# Patient Record
Sex: MALE | Race: WHITE | HISPANIC OR LATINO | Employment: OTHER | URBAN - METROPOLITAN AREA
[De-identification: names, ages, dates, MRNs, and addresses within clinical notes are randomized per-mention and may not be internally consistent; named-entity substitution may affect disease eponyms.]

---

## 2019-01-30 ENCOUNTER — APPOINTMENT (OUTPATIENT)
Dept: ADMISSIONS | Facility: MEDICAL CENTER | Age: 59
DRG: 469 | End: 2019-01-30
Attending: ORTHOPAEDIC SURGERY
Payer: COMMERCIAL

## 2019-01-30 DIAGNOSIS — Z01.812 PRE-OPERATIVE LABORATORY EXAMINATION: ICD-10-CM

## 2019-01-30 DIAGNOSIS — Z01.810 PRE-OPERATIVE CARDIOVASCULAR EXAMINATION: ICD-10-CM

## 2019-01-30 NOTE — DISCHARGE PLANNING
DISCHARGE PLANNING NOTE - TOTAL JOINT     Procedure: Procedure(s):  ANKLE TOTAL ARTHROPLASTY - CRISTOBAL DRESSING, POSS GASTROC SOLEUS RECESSION  Procedure Date: 2/14/2019  Insurance:  [facility]: [facility] / Plan: [facility]   Equipment currently available at home? crutches and kneeling scooter  Who will be with you during your recovery? spouse    Plan: Client has had a subtalar fusion in the past and ankle ligament repair and removal of ankle hardware in 2016. He left pre-admissions prior to this CM speaking to him. Anticipate discharge home.

## 2019-02-14 ENCOUNTER — HOSPITAL ENCOUNTER (INPATIENT)
Facility: MEDICAL CENTER | Age: 59
LOS: 1 days | DRG: 469 | End: 2019-02-15
Attending: ORTHOPAEDIC SURGERY | Admitting: ORTHOPAEDIC SURGERY
Payer: COMMERCIAL

## 2019-02-14 ENCOUNTER — APPOINTMENT (OUTPATIENT)
Dept: RADIOLOGY | Facility: MEDICAL CENTER | Age: 59
DRG: 469 | End: 2019-02-14
Attending: ORTHOPAEDIC SURGERY
Payer: COMMERCIAL

## 2019-02-14 VITALS
TEMPERATURE: 98.5 F | HEART RATE: 96 BPM | DIASTOLIC BLOOD PRESSURE: 92 MMHG | OXYGEN SATURATION: 93 % | SYSTOLIC BLOOD PRESSURE: 153 MMHG | RESPIRATION RATE: 17 BRPM | BODY MASS INDEX: 34.72 KG/M2 | HEIGHT: 70 IN | WEIGHT: 242.51 LBS

## 2019-02-14 LAB
ANION GAP SERPL CALC-SCNC: 7 MMOL/L (ref 0–11.9)
BUN SERPL-MCNC: 7 MG/DL (ref 8–22)
CALCIUM SERPL-MCNC: 9.5 MG/DL (ref 8.5–10.5)
CHLORIDE SERPL-SCNC: 107 MMOL/L (ref 96–112)
CO2 SERPL-SCNC: 24 MMOL/L (ref 20–33)
CREAT SERPL-MCNC: 0.92 MG/DL (ref 0.5–1.4)
GLUCOSE SERPL-MCNC: 98 MG/DL (ref 65–99)
POTASSIUM SERPL-SCNC: 3.6 MMOL/L (ref 3.6–5.5)
SODIUM SERPL-SCNC: 138 MMOL/L (ref 135–145)

## 2019-02-14 PROCEDURE — 500423 HCHG DRESSING, ABD COMBINE: Performed by: ORTHOPAEDIC SURGERY

## 2019-02-14 PROCEDURE — A4314 CATH W/DRAINAGE 2-WAY LATEX: HCPCS | Performed by: ORTHOPAEDIC SURGERY

## 2019-02-14 PROCEDURE — 502000 HCHG MISC OR IMPLANTS RC 0278: Performed by: ORTHOPAEDIC SURGERY

## 2019-02-14 PROCEDURE — 160035 HCHG PACU - 1ST 60 MINS PHASE I: Performed by: ORTHOPAEDIC SURGERY

## 2019-02-14 PROCEDURE — 0L8N3ZZ DIVISION OF RIGHT LOWER LEG TENDON, PERCUTANEOUS APPROACH: ICD-10-PCS | Performed by: ORTHOPAEDIC SURGERY

## 2019-02-14 PROCEDURE — 160041 HCHG SURGERY MINUTES - EA ADDL 1 MIN LEVEL 4: Performed by: ORTHOPAEDIC SURGERY

## 2019-02-14 PROCEDURE — A9270 NON-COVERED ITEM OR SERVICE: HCPCS | Performed by: ANESTHESIOLOGY

## 2019-02-14 PROCEDURE — 502240 HCHG MISC OR SUPPLY RC 0272: Performed by: ORTHOPAEDIC SURGERY

## 2019-02-14 PROCEDURE — 3E0T3BZ INTRODUCTION OF ANESTHETIC AGENT INTO PERIPHERAL NERVES AND PLEXI, PERCUTANEOUS APPROACH: ICD-10-PCS | Performed by: ANESTHESIOLOGY

## 2019-02-14 PROCEDURE — 160002 HCHG RECOVERY MINUTES (STAT): Performed by: ORTHOPAEDIC SURGERY

## 2019-02-14 PROCEDURE — 160036 HCHG PACU - EA ADDL 30 MINS PHASE I: Performed by: ORTHOPAEDIC SURGERY

## 2019-02-14 PROCEDURE — 73610 X-RAY EXAM OF ANKLE: CPT | Mod: RT

## 2019-02-14 PROCEDURE — 700102 HCHG RX REV CODE 250 W/ 637 OVERRIDE(OP)

## 2019-02-14 PROCEDURE — 700111 HCHG RX REV CODE 636 W/ 250 OVERRIDE (IP)

## 2019-02-14 PROCEDURE — A9270 NON-COVERED ITEM OR SERVICE: HCPCS | Performed by: ORTHOPAEDIC SURGERY

## 2019-02-14 PROCEDURE — 700101 HCHG RX REV CODE 250

## 2019-02-14 PROCEDURE — 700102 HCHG RX REV CODE 250 W/ 637 OVERRIDE(OP): Performed by: ORTHOPAEDIC SURGERY

## 2019-02-14 PROCEDURE — 503339 HCHG DRESSSING PICO: Performed by: ORTHOPAEDIC SURGERY

## 2019-02-14 PROCEDURE — A4306 DRUG DELIVERY SYSTEM <=50 ML: HCPCS | Performed by: ANESTHESIOLOGY

## 2019-02-14 PROCEDURE — A6223 GAUZE >16<=48 NO W/SAL W/O B: HCPCS | Performed by: ORTHOPAEDIC SURGERY

## 2019-02-14 PROCEDURE — 501838 HCHG SUTURE GENERAL: Performed by: ORTHOPAEDIC SURGERY

## 2019-02-14 PROCEDURE — 160029 HCHG SURGERY MINUTES - 1ST 30 MINS LEVEL 4: Performed by: ORTHOPAEDIC SURGERY

## 2019-02-14 PROCEDURE — 80048 BASIC METABOLIC PNL TOTAL CA: CPT

## 2019-02-14 PROCEDURE — 0SRF0JZ REPLACEMENT OF RIGHT ANKLE JOINT WITH SYNTHETIC SUBSTITUTE, OPEN APPROACH: ICD-10-PCS | Performed by: ORTHOPAEDIC SURGERY

## 2019-02-14 PROCEDURE — 500881 HCHG PACK, EXTREMITY: Performed by: ORTHOPAEDIC SURGERY

## 2019-02-14 PROCEDURE — 160048 HCHG OR STATISTICAL LEVEL 1-5: Performed by: ORTHOPAEDIC SURGERY

## 2019-02-14 PROCEDURE — 500367 HCHG DRAIN KIT, HEMOVAC: Performed by: ORTHOPAEDIC SURGERY

## 2019-02-14 PROCEDURE — 700102 HCHG RX REV CODE 250 W/ 637 OVERRIDE(OP): Performed by: ANESTHESIOLOGY

## 2019-02-14 PROCEDURE — 700111 HCHG RX REV CODE 636 W/ 250 OVERRIDE (IP): Performed by: PHYSICIAN ASSISTANT

## 2019-02-14 PROCEDURE — A6454 SELF-ADHER BAND W>=3" <5"/YD: HCPCS | Performed by: ORTHOPAEDIC SURGERY

## 2019-02-14 PROCEDURE — 700111 HCHG RX REV CODE 636 W/ 250 OVERRIDE (IP): Performed by: ANESTHESIOLOGY

## 2019-02-14 PROCEDURE — 160009 HCHG ANES TIME/MIN: Performed by: ORTHOPAEDIC SURGERY

## 2019-02-14 PROCEDURE — 770006 HCHG ROOM/CARE - MED/SURG/GYN SEMI*

## 2019-02-14 PROCEDURE — A9270 NON-COVERED ITEM OR SERVICE: HCPCS

## 2019-02-14 DEVICE — IMPLANTABLE DEVICE: Type: IMPLANTABLE DEVICE | Site: ANKLE | Status: FUNCTIONAL

## 2019-02-14 RX ORDER — POTASSIUM CHLORIDE 20 MEQ/1
40 TABLET, EXTENDED RELEASE ORAL DAILY
Status: DISCONTINUED | OUTPATIENT
Start: 2019-02-14 | End: 2019-02-15 | Stop reason: HOSPADM

## 2019-02-14 RX ORDER — SENNOSIDES A AND B 8.6 MG/1
8.6 TABLET, FILM COATED ORAL
Status: DISCONTINUED | OUTPATIENT
Start: 2019-02-14 | End: 2019-02-15 | Stop reason: HOSPADM

## 2019-02-14 RX ORDER — HALOPERIDOL 5 MG/ML
1 INJECTION INTRAMUSCULAR
Status: DISCONTINUED | OUTPATIENT
Start: 2019-02-14 | End: 2019-02-14 | Stop reason: HOSPADM

## 2019-02-14 RX ORDER — OXYCODONE HYDROCHLORIDE 5 MG/1
10 TABLET ORAL
Status: DISCONTINUED | OUTPATIENT
Start: 2019-02-14 | End: 2019-02-15 | Stop reason: HOSPADM

## 2019-02-14 RX ORDER — MAGNESIUM HYDROXIDE 1200 MG/15ML
LIQUID ORAL
Status: COMPLETED | OUTPATIENT
Start: 2019-02-14 | End: 2019-02-14

## 2019-02-14 RX ORDER — CYCLOBENZAPRINE HCL 10 MG
10 TABLET ORAL 2 TIMES DAILY PRN
Status: DISCONTINUED | OUTPATIENT
Start: 2019-02-14 | End: 2019-02-15 | Stop reason: HOSPADM

## 2019-02-14 RX ORDER — ONDANSETRON 2 MG/ML
4 INJECTION INTRAMUSCULAR; INTRAVENOUS
Status: DISCONTINUED | OUTPATIENT
Start: 2019-02-14 | End: 2019-02-14 | Stop reason: HOSPADM

## 2019-02-14 RX ORDER — ACETAMINOPHEN 500 MG
TABLET ORAL
Status: DISPENSED
Start: 2019-02-14 | End: 2019-02-14

## 2019-02-14 RX ORDER — ACETAMINOPHEN 500 MG
1000 TABLET ORAL EVERY 6 HOURS
Status: DISCONTINUED | OUTPATIENT
Start: 2019-02-14 | End: 2019-02-15 | Stop reason: HOSPADM

## 2019-02-14 RX ORDER — ZOLPIDEM TARTRATE 5 MG/1
10 TABLET ORAL NIGHTLY PRN
Status: DISCONTINUED | OUTPATIENT
Start: 2019-02-14 | End: 2019-02-15 | Stop reason: HOSPADM

## 2019-02-14 RX ORDER — MIDAZOLAM HYDROCHLORIDE 1 MG/ML
1 INJECTION INTRAMUSCULAR; INTRAVENOUS
Status: DISCONTINUED | OUTPATIENT
Start: 2019-02-14 | End: 2019-02-14 | Stop reason: HOSPADM

## 2019-02-14 RX ORDER — ANASTROZOLE 1 MG/1
1 TABLET ORAL
Status: DISCONTINUED | OUTPATIENT
Start: 2019-02-15 | End: 2019-02-15 | Stop reason: HOSPADM

## 2019-02-14 RX ORDER — DIPHENHYDRAMINE HYDROCHLORIDE 50 MG/ML
12.5 INJECTION INTRAMUSCULAR; INTRAVENOUS
Status: DISCONTINUED | OUTPATIENT
Start: 2019-02-14 | End: 2019-02-14 | Stop reason: HOSPADM

## 2019-02-14 RX ORDER — MUSCLE RUB CREAM 100; 150 MG/G; MG/G
1 CREAM TOPICAL 2 TIMES DAILY PRN
COMMUNITY
End: 2024-01-30

## 2019-02-14 RX ORDER — TRAZODONE HYDROCHLORIDE 50 MG/1
50 TABLET ORAL NIGHTLY
COMMUNITY

## 2019-02-14 RX ORDER — MORPHINE SULFATE 4 MG/ML
4 INJECTION, SOLUTION INTRAMUSCULAR; INTRAVENOUS
Status: DISCONTINUED | OUTPATIENT
Start: 2019-02-14 | End: 2019-02-15 | Stop reason: HOSPADM

## 2019-02-14 RX ORDER — TESTOSTERONE CYPIONATE 200 MG/ML
200 INJECTION, SOLUTION INTRAMUSCULAR SEE ADMIN INSTRUCTIONS
COMMUNITY

## 2019-02-14 RX ORDER — TADALAFIL 5 MG/1
20 TABLET ORAL PRN
COMMUNITY

## 2019-02-14 RX ORDER — POTASSIUM CHLORIDE 20 MEQ/1
40 TABLET, EXTENDED RELEASE ORAL DAILY
COMMUNITY

## 2019-02-14 RX ORDER — FUROSEMIDE 40 MG/1
60 TABLET ORAL DAILY
COMMUNITY

## 2019-02-14 RX ORDER — PREGABALIN 100 MG/1
100 CAPSULE ORAL 3 TIMES DAILY
Status: DISCONTINUED | OUTPATIENT
Start: 2019-02-14 | End: 2019-02-15 | Stop reason: HOSPADM

## 2019-02-14 RX ORDER — HYDROMORPHONE HYDROCHLORIDE 1 MG/ML
0.4 INJECTION, SOLUTION INTRAMUSCULAR; INTRAVENOUS; SUBCUTANEOUS
Status: DISCONTINUED | OUTPATIENT
Start: 2019-02-14 | End: 2019-02-14 | Stop reason: HOSPADM

## 2019-02-14 RX ORDER — OMEPRAZOLE 20 MG/1
20 CAPSULE, DELAYED RELEASE ORAL DAILY
Status: ON HOLD | COMMUNITY
End: 2024-02-06

## 2019-02-14 RX ORDER — ONDANSETRON 2 MG/ML
4 INJECTION INTRAMUSCULAR; INTRAVENOUS EVERY 4 HOURS PRN
Status: DISCONTINUED | OUTPATIENT
Start: 2019-02-14 | End: 2019-02-15 | Stop reason: HOSPADM

## 2019-02-14 RX ORDER — ANASTROZOLE 1 MG/1
1 TABLET ORAL
COMMUNITY

## 2019-02-14 RX ORDER — TRAZODONE HYDROCHLORIDE 50 MG/1
50 TABLET ORAL NIGHTLY
Status: DISCONTINUED | OUTPATIENT
Start: 2019-02-14 | End: 2019-02-15 | Stop reason: HOSPADM

## 2019-02-14 RX ORDER — ALBUTEROL SULFATE 90 UG/1
2 AEROSOL, METERED RESPIRATORY (INHALATION) EVERY 6 HOURS PRN
Status: DISCONTINUED | OUTPATIENT
Start: 2019-02-14 | End: 2019-02-15 | Stop reason: HOSPADM

## 2019-02-14 RX ORDER — SODIUM CHLORIDE, SODIUM LACTATE, POTASSIUM CHLORIDE, CALCIUM CHLORIDE 600; 310; 30; 20 MG/100ML; MG/100ML; MG/100ML; MG/100ML
INJECTION, SOLUTION INTRAVENOUS CONTINUOUS
Status: DISCONTINUED | OUTPATIENT
Start: 2019-02-14 | End: 2019-02-14 | Stop reason: HOSPADM

## 2019-02-14 RX ORDER — HYDROMORPHONE HYDROCHLORIDE 1 MG/ML
0.1 INJECTION, SOLUTION INTRAMUSCULAR; INTRAVENOUS; SUBCUTANEOUS
Status: DISCONTINUED | OUTPATIENT
Start: 2019-02-14 | End: 2019-02-14 | Stop reason: HOSPADM

## 2019-02-14 RX ORDER — OXYCODONE HYDROCHLORIDE AND ACETAMINOPHEN 5; 325 MG/1; MG/1
2 TABLET ORAL
Status: COMPLETED | OUTPATIENT
Start: 2019-02-14 | End: 2019-02-14

## 2019-02-14 RX ORDER — CELECOXIB 200 MG/1
CAPSULE ORAL
Status: DISPENSED
Start: 2019-02-14 | End: 2019-02-14

## 2019-02-14 RX ORDER — OXYCODONE HYDROCHLORIDE 5 MG/1
5 TABLET ORAL
Status: DISCONTINUED | OUTPATIENT
Start: 2019-02-14 | End: 2019-02-15 | Stop reason: HOSPADM

## 2019-02-14 RX ORDER — GUAIFENESIN 200 MG/1
200 TABLET ORAL EVERY 8 HOURS PRN
Status: ON HOLD | COMMUNITY
End: 2024-02-06

## 2019-02-14 RX ORDER — OXYCODONE HYDROCHLORIDE AND ACETAMINOPHEN 5; 325 MG/1; MG/1
1 TABLET ORAL
Status: COMPLETED | OUTPATIENT
Start: 2019-02-14 | End: 2019-02-14

## 2019-02-14 RX ORDER — OMEPRAZOLE 20 MG/1
20 CAPSULE, DELAYED RELEASE ORAL DAILY
Status: DISCONTINUED | OUTPATIENT
Start: 2019-02-14 | End: 2019-02-15 | Stop reason: HOSPADM

## 2019-02-14 RX ORDER — ALBUTEROL SULFATE 90 UG/1
2 AEROSOL, METERED RESPIRATORY (INHALATION) EVERY 6 HOURS PRN
COMMUNITY

## 2019-02-14 RX ORDER — DIPHENHYDRAMINE HYDROCHLORIDE 50 MG/ML
50 INJECTION INTRAMUSCULAR; INTRAVENOUS ONCE
Status: COMPLETED | OUTPATIENT
Start: 2019-02-14 | End: 2019-02-14

## 2019-02-14 RX ORDER — HYDROMORPHONE HYDROCHLORIDE 1 MG/ML
0.2 INJECTION, SOLUTION INTRAMUSCULAR; INTRAVENOUS; SUBCUTANEOUS
Status: DISCONTINUED | OUTPATIENT
Start: 2019-02-14 | End: 2019-02-14 | Stop reason: HOSPADM

## 2019-02-14 RX ORDER — CLOTRIMAZOLE 1 %
1 CREAM (GRAM) TOPICAL 2 TIMES DAILY
Status: DISCONTINUED | OUTPATIENT
Start: 2019-02-14 | End: 2019-02-15 | Stop reason: HOSPADM

## 2019-02-14 RX ORDER — SODIUM CHLORIDE, SODIUM LACTATE, POTASSIUM CHLORIDE, CALCIUM CHLORIDE 600; 310; 30; 20 MG/100ML; MG/100ML; MG/100ML; MG/100ML
INJECTION, SOLUTION INTRAVENOUS ONCE
Status: COMPLETED | OUTPATIENT
Start: 2019-02-14 | End: 2019-02-14

## 2019-02-14 RX ORDER — ERGOCALCIFEROL 1.25 MG/1
50000 CAPSULE ORAL
COMMUNITY
End: 2024-01-30

## 2019-02-14 RX ORDER — MEPERIDINE HYDROCHLORIDE 25 MG/ML
12.5 INJECTION INTRAMUSCULAR; INTRAVENOUS; SUBCUTANEOUS
Status: DISCONTINUED | OUTPATIENT
Start: 2019-02-14 | End: 2019-02-14 | Stop reason: HOSPADM

## 2019-02-14 RX ORDER — PRAZOSIN HYDROCHLORIDE 1 MG/1
1-2 CAPSULE ORAL
Status: ON HOLD | COMMUNITY
End: 2024-02-06

## 2019-02-14 RX ORDER — METOPROLOL TARTRATE 1 MG/ML
1 INJECTION, SOLUTION INTRAVENOUS
Status: DISCONTINUED | OUTPATIENT
Start: 2019-02-14 | End: 2019-02-14 | Stop reason: HOSPADM

## 2019-02-14 RX ORDER — LANOLIN ALCOHOL/MO/W.PET/CERES
1 CREAM (GRAM) TOPICAL
Status: ON HOLD | COMMUNITY
End: 2024-02-06

## 2019-02-14 RX ORDER — PREGABALIN 100 MG/1
100 CAPSULE ORAL 3 TIMES DAILY
COMMUNITY

## 2019-02-14 RX ADMIN — CLOTRIMAZOLE 1 APPLICATION: 10 CREAM TOPICAL at 17:06

## 2019-02-14 RX ADMIN — HYDROMORPHONE HYDROCHLORIDE 0.2 MG: 1 INJECTION, SOLUTION INTRAMUSCULAR; INTRAVENOUS; SUBCUTANEOUS at 13:50

## 2019-02-14 RX ADMIN — FENTANYL CITRATE 50 MCG: 50 INJECTION, SOLUTION INTRAMUSCULAR; INTRAVENOUS at 11:54

## 2019-02-14 RX ADMIN — SODIUM CHLORIDE, SODIUM LACTATE, POTASSIUM CHLORIDE, CALCIUM CHLORIDE 1000 ML: 600; 310; 30; 20 INJECTION, SOLUTION INTRAVENOUS at 10:15

## 2019-02-14 RX ADMIN — CYCLOBENZAPRINE 10 MG: 10 TABLET, FILM COATED ORAL at 17:10

## 2019-02-14 RX ADMIN — OXYCODONE AND ACETAMINOPHEN 2 TABLET: 5; 325 TABLET ORAL at 10:10

## 2019-02-14 RX ADMIN — OXYCODONE HYDROCHLORIDE 10 MG: 5 TABLET ORAL at 21:49

## 2019-02-14 RX ADMIN — FENTANYL CITRATE 50 MCG: 50 INJECTION, SOLUTION INTRAMUSCULAR; INTRAVENOUS at 10:19

## 2019-02-14 RX ADMIN — FENTANYL CITRATE 50 MCG: 50 INJECTION, SOLUTION INTRAMUSCULAR; INTRAVENOUS at 09:56

## 2019-02-14 RX ADMIN — PREGABALIN 100 MG: 100 CAPSULE ORAL at 17:05

## 2019-02-14 RX ADMIN — OMEPRAZOLE 20 MG: 20 CAPSULE, DELAYED RELEASE ORAL at 17:05

## 2019-02-14 RX ADMIN — HYDROMORPHONE HYDROCHLORIDE 0.4 MG: 1 INJECTION, SOLUTION INTRAMUSCULAR; INTRAVENOUS; SUBCUTANEOUS at 13:45

## 2019-02-14 RX ADMIN — FENTANYL CITRATE 50 MCG: 50 INJECTION, SOLUTION INTRAMUSCULAR; INTRAVENOUS at 09:54

## 2019-02-14 RX ADMIN — DIPHENHYDRAMINE HYDROCHLORIDE 50 MG: 50 INJECTION INTRAMUSCULAR; INTRAVENOUS at 21:50

## 2019-02-14 RX ADMIN — OXYCODONE HYDROCHLORIDE 10 MG: 5 TABLET ORAL at 17:09

## 2019-02-14 RX ADMIN — HYDROMORPHONE HYDROCHLORIDE 0.4 MG: 1 INJECTION, SOLUTION INTRAMUSCULAR; INTRAVENOUS; SUBCUTANEOUS at 13:40

## 2019-02-14 RX ADMIN — DIPHENHYDRAMINE HYDROCHLORIDE 12.5 MG: 50 INJECTION INTRAMUSCULAR; INTRAVENOUS at 10:31

## 2019-02-14 RX ADMIN — METOPROLOL TARTRATE 25 MG: 25 TABLET, FILM COATED ORAL at 17:10

## 2019-02-14 RX ADMIN — FENTANYL CITRATE 50 MCG: 50 INJECTION, SOLUTION INTRAMUSCULAR; INTRAVENOUS at 11:51

## 2019-02-14 RX ADMIN — FENTANYL CITRATE 50 MCG: 50 INJECTION, SOLUTION INTRAMUSCULAR; INTRAVENOUS at 10:01

## 2019-02-14 RX ADMIN — SODIUM CHLORIDE, SODIUM LACTATE, POTASSIUM CHLORIDE, CALCIUM CHLORIDE: 600; 310; 30; 20 INJECTION, SOLUTION INTRAVENOUS at 06:16

## 2019-02-14 RX ADMIN — POTASSIUM CHLORIDE 40 MEQ: 1500 TABLET, EXTENDED RELEASE ORAL at 17:05

## 2019-02-14 RX ADMIN — SENNOSIDES 8.6 MG: 8.6 TABLET, FILM COATED ORAL at 17:06

## 2019-02-14 RX ADMIN — ACETAMINOPHEN 1000 MG: 500 TABLET ORAL at 17:11

## 2019-02-14 ASSESSMENT — PATIENT HEALTH QUESTIONNAIRE - PHQ9
4. FEELING TIRED OR HAVING LITTLE ENERGY: NOT AT ALL
SUM OF ALL RESPONSES TO PHQ QUESTIONS 1-9: 3
6. FEELING BAD ABOUT YOURSELF - OR THAT YOU ARE A FAILURE OR HAVE LET YOURSELF OR YOUR FAMILY DOWN: NOT AL ALL
1. LITTLE INTEREST OR PLEASURE IN DOING THINGS: NOT AT ALL
3. TROUBLE FALLING OR STAYING ASLEEP OR SLEEPING TOO MUCH: NOT AT ALL
8. MOVING OR SPEAKING SO SLOWLY THAT OTHER PEOPLE COULD HAVE NOTICED. OR THE OPPOSITE, BEING SO FIGETY OR RESTLESS THAT YOU HAVE BEEN MOVING AROUND A LOT MORE THAN USUAL: NOT AT ALL
9. THOUGHTS THAT YOU WOULD BE BETTER OFF DEAD, OR OF HURTING YOURSELF: NOT AT ALL
SUM OF ALL RESPONSES TO PHQ9 QUESTIONS 1 AND 2: 1
5. POOR APPETITE OR OVEREATING: NOT AT ALL
7. TROUBLE CONCENTRATING ON THINGS, SUCH AS READING THE NEWSPAPER OR WATCHING TELEVISION: MORE THAN HALF THE DAYS
2. FEELING DOWN, DEPRESSED, IRRITABLE, OR HOPELESS: SEVERAL DAYS

## 2019-02-14 ASSESSMENT — LIFESTYLE VARIABLES: EVER_SMOKED: NEVER

## 2019-02-14 NOTE — OR NURSING
Pre-op complete. Patient and family updated on plan of care. All questions answered at this time.  Call light within reach, advised to call for any questions or concerns. Hourly rounding in place.

## 2019-02-14 NOTE — OR SURGEON
Immediate Post OP Note    PreOp Diagnosis: Right ankle OA    PostOp Diagnosis: Same    Procedure(s):  ANKLE TOTAL ARTHROPLASTY ; POSS GASTROC SOLEUS RECESSION - Wound Class: Clean    Surgeon(s):  ALEJA Chandler M.D. Patrick S Barousse, M.D.    Anesthesiologist/Type of Anesthesia:  Anesthesiologist: Sangita French M.D./General    Surgical Staff:  Circulator: Zoë Mcdonald R.N.; Stephanie Honeycutt R.N.  Scrub Person: Deidre Kaufman  Radiology Technologist: Brandon HOFFMAN Gross    Specimens removed if any:  * No specimens in log *    Estimated Blood Loss: 10cc    TT: 118 min @ 250mmHg    Findings: Degen change, varus tilt    Complications: None        2/14/2019 9:31 AM Sonido Lujan M.D.

## 2019-02-14 NOTE — OR NURSING
PT c/o severe bladder pain, bladder scanner brought to bedside, scanned for 871 ml urine, 18F ospina placed, clear yellow urine drainage, approx 1500 ml clear yellow urine.  PT ex wife Cate at bedside, updated on status.

## 2019-02-14 NOTE — OR NURSING
Called into OR to speak w/ ANES/surgeon, advised RN of situation, PT wants to leave [facility], advised Dr. Lujan will stop by after this case, PT and ex wife updated on status. Belongings obtained and at PT bedside.

## 2019-02-14 NOTE — PROGRESS NOTES
"Ortho Prog Note    POD #0 s/p Right total ankle arthroplasty, GSR    Unhappy about placement of Ospina in PACU, will stay this evening and overnight for proper recovery    /88   Pulse 94   Temp 37.4 °C (99.3 °F) (Temporal)   Resp 18   Ht 1.778 m (5' 10\")   Wt 110 kg (242 lb 8.1 oz)   SpO2 97%   BMI 34.80 kg/m²     RLE: Block in place. SLS clean and dry      POD #0 Ospina in place    Admit ortho overnight  NWB RLE  PT eval and treat  May DC ospina this evening or tomorrow morning per patient preference  DC home tomorrow am    Sonido Lujan MD    "

## 2019-02-14 NOTE — PROGRESS NOTES
Post op evaluation of Sciatic catheter/Saphenous block placed preoperatively.    Pt seen and examined in PACU.  Pt complains of throbbing, aching pain in back of leg from his ankle jail up his calf.  Bottom of foot and lateral foot is numb.      Examination with ultrasound revealed catheter in good position.  10ml bolus of bupivicaine 0.5% through catheter.  In addition, reblock higher in leg for sciatic block with additional 15mL bupivicaine under ultrasound guidance.     As previously discussed with patient, he has had issues with ONQ catheters in the past not working when he had surgeries down in at [place] in 2011, 2012 for similar procedures. He believed they were issues with the devices, but he did not have luck with pain relief with them in the past.     Discussed with pt in detail, will increase rate of ONQ to 10mL/hr to potentially get more spread up his leg for better coverage. If it still provides no relief after reblock wears off, will plan to pull catheter tomorrow.      Pt may need placement of catheter higher in his leg in the future (he will be scheduled for another followup leg procedure in the future).    Sangita French MD  Anesthesiology

## 2019-02-14 NOTE — OR NURSING
Rec'd PT from OR w/ Anes/RN.  PT agitated, restless and anxious upon admit to PACU, not knew behavior per hx.  PT medicated for severe pain, Fentanyl 50 mcg x 4 , Oxycodone 10 mg PO and Benadryl 12.5mg for c/o itching.  OnQ pump intact, 6 ml to start, hemovac clamped w/ serosanguinous drainage noted, CRISTOBAL in place, buzzing noted, paged Dr. Lujan regarding same, Dr. Lujan to bedside, CRISTOBAL in place, reinforced w/ tape.  Anesthesia to bedside to check placement of On Q due to PT pain, placement checked, bolus given.  PT attempting to urinate , has hx of post op retention.  RX on chart, d/c order in computer, report to be called to d/c area.

## 2019-02-15 NOTE — PROGRESS NOTES
Charge RN Notes:    Met with patient for service recovery. Offered to examine the groin area but the patient strongly refused to let this RN do a full assessment on it. Patient is adamant in leaving AMA. Explained that MD is aware of the situation and he has added measures to relieve the  Irritation. Pt has been prescribed with Clotrimazole and Benadryl.     Called Dalila Song, updated her about patient's condition. She said she will consult with Dr. Boston.    Dr. Boston called back and said that we have properly  addressed the patient's concerns.    @0010 Spoke with the patient again and he is already getting dressed and decided to leave AMA. Patient educated about the risks and consequences of leaving AMA.

## 2019-02-15 NOTE — PROGRESS NOTES
1900 Received report from Day RN, patient has redness and irritation on his groin and scrotum due to CHG wipes prior to surgery this morning. Patient has been putting cream to area and has been using ice pack.   2100 Patient c/o of increased pain on scrotum and that ice pack and cream is not helping at all. He requests to see an MD at bedside to evaluate.  2115 Paged VICTORIA Song from (Dr Michel office), notified of patient's request. Per PA, will see pt kumar., continue with cream and ice pack. Wash sulema-area with soap and water. Also received order for Benadryl 50mg IV one time.   2130 Updated pt of POC, pt verbalized wanting to leave AMA. [facility]. Notified Charge RN.

## 2019-02-15 NOTE — PROGRESS NOTES
Patient decided to leave [facility] despite education.  Patient has ospina cath, hemovac, ONQ pump, and CRISTOBAL in place. Patients PIV d/c'd. Escorted by 2 staff RN via w/c to his car, patient left with his ex-wife Cate.

## 2019-02-15 NOTE — PROGRESS NOTES
· 2 RN skin check complete with VILMA Arguello.  · Devices in place on Q pump, prevena, splint, hemovac, SCDs.  · Skin assessed under devices unable to assess with splint.  · Redness to scrotum from CHG wipes, cream given.

## 2019-02-15 NOTE — PROGRESS NOTES
Anesthesia Progress Note    - Postop followup of Sciatic catheter placed 02/14/2019 for total ankle arthroplasty and gastroc recession with Dr. Lujan.       Attempted to contact pt on cell phone on file, as pt left hospital AMA prior to being examined.  Left voicemail and will attempt to reach later.  Pt should have been sent home with informational brochure regarding the catheter with contact information on how to reach an MD on call if there are any issues.  Pt has had OnQ pump in the past... Will attempt to call pt again later this afternoon and tomorrow to followup.    Sangita French MD    02/15/19 [id number]

## 2019-02-20 NOTE — OP REPORT
DATE OF SERVICE:  02/14/2019    PREOPERATIVE DIAGNOSES:  Right ankle posttraumatic arthritis.    POSTOPERATIVE DIAGNOSES:  Right ankle posttraumatic arthritis.    PROCEDURE:  1.  Right STAR total ankle arthroplasty.  2.  Right gastrocsoleus recession.    SURGEON:  Sonido Lujan MD    ASSISTANT:  Isaias Trotter MD; Nasim Duggan MD and Carola Good,   certified first assist.    ANESTHESIA:  General with peripheral nerve catheter requested by me for   postoperative pain control.    ESTIMATED BLOOD LOSS:  20 mL.    TOURNIQUET TIME:  118 minutes at 250 mmHg.    FINDINGS:  Degenerative change within the joint, mild underlying hindfoot   valgus after triple fusion at an outside institution.    IMPLANTS:  [place] STAR large tibial component, small talar component, 11 mm   poly.    COMPLICATIONS:  None.    OUTCOME:  PACU in stable.    HISTORY OF PRESENT ILLNESS:  This gentleman is well known to me.  He has had   multiple procedures at outside institutions including a triple fusion.  I have   also performed hardware removal and ankle arthroscopy years ago.  He has had   numerous injections that have relieved nearly all of his discomfort.  He is   now indicated for a total ankle arthroplasty.  He was greeted in the   preoperative holding area and identified by name and medical record number.    The right lower extremity was marked.  Risks of the procedure including   bleeding, infection, pain, continuation of symptoms, neurovascular damage, and   need for more surgery were discussed and he provided written consent.    DESCRIPTION OF PROCEDURE:  He was taken to the operating room, placed on the   table in supine position.  Preoperative antibiotics were administered and   general anesthesia was induced.  A nonsterile tourniquet was placed on the   right thigh.  Right lower extremity prepped and draped in the usual sterile   fashion.  An operative pause was undertaken, where all present were in   agreement with  patient identification, laterality, and procedure to be   performed.  Esmarch bandage was used to exsanguinate the limb and the   tourniquet was raised to 250 mmHg.  A 12 cm longitudinal incision was made for   direct anterior approach.  This did incorporate a small anterior incision he   had had previously at the level of the joint.  Care was taken to avoid damage   to the neurovascular bundle as well as to avoid violating the tibialis   anterior tendon sheath.  I performed an arthrotomy and using combination of   Bovie electrocautery and Delacruz elevator, performed a subperiosteal dissection   of the anterior distal tibia and the dorsal talar neck to the level of the   fibula and the level of the medial malleolus.  We used an osteotome and   rongeur to remove small osteophytes.  We then drilled a 3.2 mm self-drilling   pin into the tibial tubercle at approximately 10 degrees of plantarflexion.    The tibial alignment guide was then placed just greater than 2 fingerbreadths   above the level of the tibia and alignment with the tibial crest.  I placed an   osteotome and the medial gutter to align the cut guide.  We then evaluated   along the course of the tibia with x-ray to ensure proper varus/valgus.  I   then pinned the tibial alignment guide into place with a single unicortical   pin.  We then checked lateral with Jerson Wing to adjust cut height and   plantarflexion, dorsiflexion, small adjustments were made.  We then pinned the   block into place with bicortical pins.  We then used an oscillating saw to   perform the tibial cut followed by a reciprocating saw after placement of the   pins to protect the medial malleolus and the fibula.  We then piecemeal the   cut segments and removed all bone.  We ensured smooth cuts as well as the   safety of the medial malleolus.  We then used a 9 mm joint space evaluator and   determined that cut was adequate.  We then used a curette to remove any   remaining cartilage on the  talus.  We placed a talar cut guide, placed the   foot in approximately 90 degrees and pin the cut guide into place.  We ensured   that the talus was touching the paddle both medially and laterally.  We   inserted medial and lateral protected pins and using an oscillating saw to   perform the cut.  We then used a 12 mm joint space evaluator and determined   there was adequate space.  The posterior cut sizing template was then inserted   and we elected for a size small component.  We then attached the drill guide   and drilled the central pin after confirmation of proper alignment on lateral   fluoroscopy.  We then inserted small-sized datum with drill pins.  We did the   posterior cut with an oscillating saw, then reamed the anterior aspect of the   talus.  The shavings were saved for bone graft.  We then removed the AP   cutting guide.  We irrigated the joint.  We placed the medial and lateral cut   guide and used a reciprocating saw to make these cuts.  Skin small curved   osteotome was used to remove the medial and lateral aspects of cut talar bone.    We then adjusted this to ensure proper fit of the talar window trial.  We   reamed the keel with the keel mill this was then punched with the keel broach.    We ensured proper talar cut.  We then measured the tibial component to a   size large.  We tested radiographically both large and extra-large and decided   on large.  We then irrigated the joint and inserted a small size talar   component.  We then placed the blue wedge between the talar component and the   barrel PEP guide as we inserted barrel PEP guide on direct lateral radiograph.    The first barrel hole drill was used to drill 1 hole followed by the keel   punch, then plugged.  We then drilled and punched the second.  We irrigated   these tracts.  We then implanted the large tibial component under direct   radiographic guidance.  We then tested the range of motion of the ankle, a 9   mm poly was felt to  be a bit loose, we upsized to an 11.  We felt at this   point that the Silfverskiold test was positive and that the patient would   benefit from a gastrocsoleus recession.    Gastrocsoleus recession:  A 3 cm longitudinal incision was made along the   medial border of the myotendinous junction of the gastrocsoleus complex.    Blunt dissection was carried down to the crural fascia, which was then incised   in line with the incision.  The gastroc fascia was visualized in its entirety   and transected with a 15 blade.  This did give us 20 degrees of additional   dorsiflexion with the knee in extension.  This incision was copiously   irrigated.  Subcutaneous layer was closed with 3-0 Monocryl in buried   interrupted fashion and skin was closed with 3-0 nylon in horizontal mattress   fashion.  I then noted improved range of motion at the ankle with both the   knee in extension and flexion, symmetric liftoff medially and laterally that   was quite acceptable, good range of motion was stable.  We then again   irrigated the joint.  We packed bone graft into the remaining barrel into the   anterior aspect of the barrel hole and implanted an 11 mm spacer.  AP, mortise   and lateral fluoroscopy revealed excellent alignment of the component without   evidence of interval complication.  We then closed the capsular layer with 0   Vicryl in figure-of-eight fashion.  The inferior extensor retinaculum with 0   Vicryl in figure-of-eight fashion, the subcutaneous layer with 3-0 Vicryl in   buried interrupted fashion and skin closed with 3-0 nylon in horizontal   mattress fashion.  We did close this over a Hemovac drain.  We then placed a   [employer] CRISTOBAL disposable incisional wound VAC.  The tourniquet was let   down.  A well-padded posterior splint with stirrup was placed.  Patient was   awakened and extubated in stable condition.    POSTOPERATIVE COURSE:  He will be admitted overnight for pain control and   mobilization.  We  will see him in 10-14 days for suture removal and wound   check, aspirin 81 mg twice daily for DVT prophylaxis.       ____________________________________     MD BRANDO SAHNI / ARLYN    DD:  02/20/2019 07:18:58  DT:  02/20/2019 09:26:16    D#:  [id number]  Job#:  [id number]

## 2022-11-06 ASSESSMENT — ENCOUNTER SYMPTOMS
SLEEP DISTURBANCE: 1
TIME GOING TO BED: VARIES

## 2022-11-07 ENCOUNTER — OFFICE VISIT (OUTPATIENT)
Dept: SLEEP MEDICINE | Facility: MEDICAL CENTER | Age: 62
End: 2022-11-07
Payer: COMMERCIAL

## 2022-11-07 VITALS
HEART RATE: 99 BPM | HEIGHT: 69 IN | SYSTOLIC BLOOD PRESSURE: 112 MMHG | RESPIRATION RATE: 16 BRPM | BODY MASS INDEX: 36.58 KG/M2 | DIASTOLIC BLOOD PRESSURE: 70 MMHG | OXYGEN SATURATION: 93 % | WEIGHT: 247 LBS

## 2022-11-07 DIAGNOSIS — G47.00 FREQUENT NOCTURNAL AWAKENING: ICD-10-CM

## 2022-11-07 DIAGNOSIS — F51.04 CHRONIC INSOMNIA: ICD-10-CM

## 2022-11-07 DIAGNOSIS — G47.33 OSA (OBSTRUCTIVE SLEEP APNEA): Primary | ICD-10-CM

## 2022-11-07 DIAGNOSIS — R53.83 FATIGUE, UNSPECIFIED TYPE: ICD-10-CM

## 2022-11-07 PROCEDURE — 99204 OFFICE O/P NEW MOD 45 MIN: CPT | Performed by: STUDENT IN AN ORGANIZED HEALTH CARE EDUCATION/TRAINING PROGRAM

## 2022-11-07 NOTE — PROGRESS NOTES
"     [facility] Consult Note     Date: 11/7/2022 / Time: 12:46 PM      Thank you for requesting a sleep medicine consultation on Zoltan Shin at the sleep center. Presents today with the chief complaints of obstructive sleep apnea, insomnia, daytime fatigue. He is referred by No referring provider defined for this encounter. for evaluation and treatment of sleep disorder breathing .     HISTORY OF PRESENT ILLNESS:     Zoltan Shin is a 62 y.o. male with hypertension, chronic pain, obesity, insomnia, obstructive sleep apnea, fatigue, GERD, and bowel perforation status post ileostomy.  Presents to Sleep Clinic for evaluation of poor sleep.     States [facility] most [facility]  He has tried CPAP in the past and had difficulty tolerating it feeling that it built up a \"back pressure\" which caused increased sinus infections.  This led to him no longer using his CPAP machine.  His current machine he has not used in approximately 2 years.    He continues to have difficulty with sleep maintenance.  He wakes up anywhere between 3 and 4 times a night.  He often has trouble getting back to sleep with his awakenings.  He will wake up gasping for air at times.  He tends to not find his sleep restorative.  His sleep schedule varies nights a night.  States there are some days where he can early in the night and other days where he stays up late.  This leads him to having an irregular wake schedule as well.  States he can wake up at anywhere between 1 and 6 AM to start his day.  He does nap during the day generally about 2 times and states they are short anywhere between 10 and 15 minutes.  States his sleep schedule has been happening for years.    He continues to feel exhausted throughout the day.  He has tried melatonin and [employer] sleep aids in the past with no benefit.  He continues to mayers with his chronic pain as well as having his ileostomy reversed in " "the future.  He does have chronic ankle pain which has required multiple surgeries.  His ankle is currently in a boot.    As per supplemental questionnaire to be scanned or imported into chart:      Sleep Schedule  Bedtime: Weekday & Weekend varies depends   Wake time: Weekday & Weekend 1-6am  Sleep-onset latency: 20-30min   Awakenings from sleep: 3-4  Difficulty falling back asleep: yes, most nights  Bedroom partner: no   Naps: Yes, 1-2 a day 10-15min     DAYTIME SYMPTOMS:   Excessive daytime sleepiness: Yes  Daytime fatigue: Yes  Difficulty concentrating: Yes  Memory problems: Yes  Irritability:Yes  Work/school performance issues: No   Sleepiness with driving: No   Caffeine/stimulant use: No   Alcohol use:No     SLEEP RELATED SYMPTOMS  Snoring: Yes  Witnessed apnea or gasping/choking: Yes  Dry mouth or mouth breathing: Yes  Sweating: Yes  Teeth grinding/biting: Yes  Morning headaches: Yes  Refreshed Upon Awakening: No      SLEEP RELATED BEHAVIORS:  Parasomnias (walking, talking, eating, violence): Yes, talking   Leg kicking: No   Restless legs - \"urge to move\": Yes and contractions in damaged foot   Nightmares: No  Recurrent: No   Dream enactment: No      NARCOLEPSY:  Cataplexy: No   Sleep paralysis: No   Sleep attacks: No   Hypnagogic/hypnopompic hallucinations: No     MEDICAL HISTORY  Past Medical History:   Diagnosis Date    Abdominal pain     Anesthesia     difficulty waking up, over sedation    Apnea, sleep     Arthritis     knees, right foot, right ankle    Back pain     Back pain     Bowel habit changes     constipation    Chest tightness     Daytime sleepiness     Earache     Environmental and seasonal allergies     Fatigue     Frequent headaches     Gasping for breath     Heart burn     Hypertension     Insomnia     Morning headache     Obesity     Pain 08/19/2016    bilat knees, right ankle & foot, hips, 8/10    Painful joint     Psychiatric problem     anxiety/depression    Shortness of breath     Sleep " apnea     CPAP- not using now, causes sinus infection    Snoring     Sore muscles     Wears glasses         SURGICAL HISTORY  Past Surgical History:   Procedure Laterality Date    ANKLE TOTAL ARTHROPLASTY Right 02/14/2019    Procedure: ANKLE TOTAL ARTHROPLASTY ; GASTROC SOLEUS RECESSION;  Surgeon: Sonido Lujan M.D.;  Location: SURGERY [facility];  Service: Orthopedics    ANKLE ARTHROSCOPY Right 08/23/2016    Procedure: ANKLE ARTHROSCOPY;  Surgeon: Sonido Lujan M.D.;  Location: SURGERY [facility];  Service:     HARDWARE REMOVAL ORTHO Right 08/23/2016    Procedure: HARDWARE REMOVAL ORTHO cannulated screws;  Surgeon: Sonido Lujan M.D.;  Location: SURGERY [facility];  Service:     LIGAMENT REPAIR Right 08/23/2016    Procedure: LIGAMENT REPAIR Lateral Stabilization,  and Peroneal Tenosynovectomy with repair;  Surgeon: Sonido Lujan M.D.;  Location: SURGERY [facility];  Service:     GASTRIC BYPASS LAPAROSCOPIC  2011    KNEE REPLACEMENT, TOTAL Left 2011    ANKLE ARTHROSCOPY Right 2012/2014 x2/2015    ARTHROSCOPY, KNEE      COLON RESECTION      GASTROSTOMY      SLEEVE,KEVIN VASO THIGH          FAMILY HISTORY  Family History   Problem Relation Age of Onset    Hypertension Father        SOCIAL HISTORY  Social History     Socioeconomic History    Marital status:    Tobacco Use    Smoking status: Never    Smokeless tobacco: Former     Types: Chew    Tobacco comments:     chewed for a bit quit 2015   Vaping Use    Vaping Use: Never used   Substance and Sexual Activity    Alcohol use: No    Drug use: Never        Occupation: Retired     CURRENT MEDICATIONS  Current Outpatient Medications   Medication Sig Dispense Refill    albuterol 108 (90 Base) MCG/ACT Aero Soln inhalation aerosol Inhale 2 Puffs every 6 hours as needed for Shortness of Breath.      vitamin D, Ergocalciferol, (DRISDOL) 81840 units Cap capsule Take 1 Capsule by mouth every 7 days.      furosemide (LASIX) 40 MG Tab Take  1.5 Tablets by mouth 1 time a day as needed.      guaiFENesin (ROBITUSSIN) 100 MG/5ML Solution Take 5 mL by mouth every 6 hours as needed.      guaifenesin 200 MG tablet Take 1 Tablet by mouth every 8 hours as needed.      Skin Protectants, Misc. (EUCERIN) Cream Apply 1 Application to affected area(s) 1 time daily as needed. Dry skin      LACTOBACILLUS PO Take 1 Cap by mouth 3 times a day as needed.      menthol-methyl salicycate (MUSCLE RUB) 10-15 % Cream Apply 1 Application to affected area(s) 2 times a day as needed.      metoprolol (LOPRESSOR) 25 MG Tab Take 1 Tablet by mouth 2 times a day.      omeprazole (PRILOSEC) 20 MG delayed-release capsule Take 1 Capsule by mouth every day.      potassium chloride SA (KDUR) 20 MEQ Tab CR Take 2 Tablets by mouth every day. Dissolved in 4 ounces of water      prazosin (MINIPRESS) 1 MG Cap Take 1-2 Capsules by mouth at bedtime as needed.      pregabalin (LYRICA) 100 MG Cap Take 1 Capsule by mouth 3 times a day.      tadalafil (CIALIS) 5 MG tablet Take 4 Tablets by mouth as needed for Erectile Dysfunction.      testosterone cypionate (DEPO-TESTOSTERONE) 200 MG/ML Solution injection Inject 1 mL into the shoulder, thigh, or buttocks see administration instructions. Every 10 days      traZODone (DESYREL) 50 MG Tab Take 1 Tablet by mouth every evening.      B Complex Vitamins (VITAMIN-B COMPLEX PO) Take 1 Cap by mouth 2 times a day.      ascorbic acid (VITAMIN C) 250 MG tablet Take 1 Tablet by mouth every day.      anastrozole (ARIMIDEX) 1 MG Tab Take 1 Tablet by mouth every Monday, Wednesday, and Friday.      aspirin EC (ECOTRIN) 81 MG Tablet Delayed Response Take 1 Tablet by mouth 2 times a day.      sennosides (SENOKOT) 8.6 MG Tab Take 1 Tablet by mouth 1 time a day as needed.      Cyanocobalamin (VITAMIN B-12) 1000 MCG Tab Take 1 Tablet by mouth every day.      fluticasone (FLONASE) 50 MCG/ACT nasal spray Administer 1 Spray into affected nostril(S) every day. Indications:  "Hayfever      triamcinolone acetonide (KENALOG) 0.025 % Cream Apply 1 Application to affected area(s) as needed.      clotrimazole (LOTRIMIN) 1 % Cream Apply 1 Application topically 2 times a day.      Loratadine 10 MG Cap Take 1 Cap by mouth every day.      cyclobenzaprine (FLEXERIL) 10 MG Tab Take 1 Tablet by mouth 2 times a day as needed. Indications: Muscle Spasm      sodium chloride (OCEAN) 0.65 % Solution Administer 1 Spray into affected nostril(S) as needed for Congestion.       No current facility-administered medications for this visit.       REVIEW OF SYSTEMS  Constitutional: Denies fevers, Denies weight changes  Ears/Nose/Throat/Mouth: Denies nasal congestion or sore throat   Cardiovascular: occasional chest pain  Respiratory: chronic shortness of breath, Denies cough  Gastrointestinal/Hepatic: nausea taking zofran, vomiting  Sleep: see HPI    Physical Examination:  Vitals/ General Appearance:   Weight/BMI: Body mass index is 36.48 kg/m².  /70 (BP Location: Left arm, Patient Position: Sitting, BP Cuff Size: Large adult)   Pulse 99   Resp 16   Ht 1.753 m (5' 9\")   Wt 112 kg (247 lb)   SpO2 93%   Vitals:    11/07/22 1247   BP: 112/70   BP Location: Left arm   Patient Position: Sitting   BP Cuff Size: Large adult   Pulse: 99   Resp: 16   SpO2: 93%   Weight: 112 kg (247 lb)   Height: 1.753 m (5' 9\")       Pt. is alert and oriented to time, place and person. Cooperative and in no apparent distress.     Constitutional: Alert, no distress, well-groomed.  Skin: No rashes in visible areas.  Eye: Round. Conjunctiva clear, lids normal. No icterus.   ENT EXAM  Nasal alae/valves collapsible: No   Nasal septum deviation: Yes  Nasal turbinate hypertrophy: Left: Grade 1   Right: Grade 1  Hard palate narrow: No   Hard palate high: No   Soft palate/uvula (Mallampati score): 4  Tongue Scalloping: Yes  Retrognathia: No   Micrognathia: No   Cardiovascular:no murmus/gallops/rubs, normal S1 and S2 heart sounds, " regular rate and rhythm  Pulmonary:Clear to auscultation, No wheezes, No crackles.  Neurologic:Awake, alert and oriented x 3, No involuntary motions.  Extremities: No clubbing, cyanosis.  Right foot in orthopedic boot       ASSESSMENT AND PLAN   Zoltan Shin is a 62 y.o. male with hypertension, chronic pain, obesity, insomnia, obstructive sleep apnea, fatigue, GERD, and bowel perforation status post ileostomy.  Presents to Sleep Clinic for evaluation of poor sleep.    1. Zoltan Shin  has history of Obstructive Sleep Apnea (PAYTON). Zoltan Shin has symptoms of snoring, choking/gasping during sleep, and tiredness, fatigue morning headaches, unrefreshed upon awakening. These  interfere with activities of daily living.     Had difficulty tolerating CPAP in the past he does not believe he has tried BiPAP.    The pathophysiology of PAYTON and the increased risk of cardiovascular morbidity from untreated PAYTON is discussed in detail with the patient. He  also has HTN, heart disease, chronic pain, GERD which can be worsened by PAYTON.      We have discussed diagnostic options including in-laboratory, attended polysomnography and home sleep testing. We have also discussed treatment options including airway pressurization, reconstructive otolaryngologic surgery, dental appliances and weight management.     Subsequently,treatment options will be discussed based on the diagnostic study. Meanwhile, He is urged to avoid supine sleep, weight gain and alcoholic beverages since all of these can worsen PAYTON. He is cautioned against drowsy driving. If He feels sleepy while driving, advised must pull over for a break/nap, rather than persist on the road, in the interest of Pt's own safety and that of others on the road.  Reports [facility]  He is open to trying BiPAP therapy.    Plan  -  He  will be scheduled for an overnight PSG titration study to see if BiPAP is more  comfortable for the patient to use and can control his sleep apnea  - Follow up 1-2 weeks after sleep study to discuss results and treatment options moving forward   -Advised to reach out via [employer] or by phone with any questions or concerns.     2. Chronic Insomnia   With frequent awakenings with difficulty falling back asleep and feeling this is impacting daily functioning for years meets criteria for chronic insomnia. Discussed potential causes of insomnia and importance of having a routine around bedtime.  Discussed that untreated sleep apnea can worsen insomnia.  Particularly sleep maintenance insomnia which is his main complaint.  Advised that treating his sleep apnea will likely improve his sleep.  In addition have recommended he try to work on getting a regular sleep schedule.  .     RECOMMENDATIONS  -Maintain a regular sleep schedule  -Avoid caffeinated beverages after lunch, and alcohol in late afternoon and evening  -Avoid prolonged use of light-emitting screens before bedtime  -Exercise regularly for at least 20 minutes, preferably more than four to five hours prior to bedtime  -Avoid daytime naps, especially if they are longer than 20 to 30 minutes or occur late in the day  -Advised to contact our office or myself with any questions via [employer]     Regarding treatment of other past medical problems and general health maintenance,  Pt is urged to follow up with PCP.      Please note portions of this record was created using voice recognition software. I have made every reasonable attempt to correct obvious errors, but I expect that there are errors of grammar and possibly content I did not discover before finalizing the note.

## 2022-11-28 ENCOUNTER — SLEEP STUDY (OUTPATIENT)
Dept: SLEEP MEDICINE | Facility: MEDICAL CENTER | Age: 62
End: 2022-11-28
Attending: STUDENT IN AN ORGANIZED HEALTH CARE EDUCATION/TRAINING PROGRAM
Payer: COMMERCIAL

## 2022-11-28 DIAGNOSIS — G47.33 OSA (OBSTRUCTIVE SLEEP APNEA): ICD-10-CM

## 2022-11-28 PROCEDURE — 95811 POLYSOM 6/>YRS CPAP 4/> PARM: CPT | Performed by: STUDENT IN AN ORGANIZED HEALTH CARE EDUCATION/TRAINING PROGRAM

## 2022-12-02 NOTE — PROCEDURES
MONTAGE: Standard  STUDY TYPE: Treatment    RECORDING TECHNIQUE:   After the scalp was prepared, gold plated electrodes were applied to the scalp according to the International 10-20 System. EEG (electroencephalogram) was continuously monitored from the O1-M2, O2-M1, C3-M2, C4-M1, F3-M2, and F4-M1. EOGs (electrooculograms) were monitored by electrodes placed at the left and right outer canthi. Chin EMG (electromyogram) was monitored by electrodes placed on the mentalis and sub-mentalis muscles. Nasal and oral airflow were monitored using a triple port thermocouple as well as oronasal pressure transducer. Respiratory effort was measured by inductive plethysmography technology employing abdominal and thoracic belts. Blood oxygen saturation and pulse were monitored by pulse oximetry. Heart rhythm was monitored by surface electrocardiogram. Leg EMG was studied using surface electrodes placed on left and right anterior tibialis. A microphone was used to monitor tracheal sounds and snoring. Body position was monitored and documented by technician observation.   SCORING CRITERIA:   A modification of the AASM manual for scoring of sleep and associated events was used. Obstructive apneas were scored by cessation of airflow for at least 10 seconds with continuing respiratory effort. Central apneas were scored by cessation of airflow for at least 10 seconds with no respiratory effort. Hypopneas were scored by a 30% or more reduction in airflow for at least 10 seconds accompanied by arterial oxygen desaturation of 3% or an arousal. For CMS (Medicare) patients, per AASM rule 1B, hypopneas are scored by 30% with mild reduction in airflow for at least 10 seconds accompanied by arterial saturation decreased at 4%.    Study start time was 10:42:05 PM. Diagnostic recording time was 6h 16.5m with a total sleep time of 5h 32.5m resulting in a sleep efficiency of 88.31%%. Sleep latency from the start of the study was 03 minutes and the  latency from sleep to REM was 110 minutes. In total,41 arousals were scored for an arousal index of 7.4.  Respiratory:  There were a total of 9 apneas consisting of 0 obstructive apneas, 0 mixed apneas, and 9 central apneas. A total of 14 hypopneas were scored. The apnea index was 1.62 per hour and the hypopnea index was 2.53 per hour resulting in an overall AHI of 4.15. AHI during rem was 6.6 and AHI while supine was 4.15.  Oximetry:  There was a mean oxygen saturation of 91.0%. The minimum oxygen saturation in NREM was 81.0 % and in REM was 86.0%. The patient spent 30.5 minutes of TST with SaO2 <88%.  Cardiac:  The highest heart rate seen while awake was 96 BPM while the highest heart rate during sleep was 93 BPM with an average sleeping heart rate of 70 BPM.  Limb Movements:  There were a total of 0 PLMs during sleep which resulted in a PLMS index of 0.0. Of these, 8 were associated with arousals which resulted in a PLMS arousal index of 1.4.  Titration:   BiPAP was tried from 8/4 to 14/10cm H2O.   This was a fully attended sleep study. This test was technically adequate. This patient was titrated on BiPAP starting at 8/4 cm of water pressure. Patient was titrated up to 14/10 cm of water pressure. Patient did best at 13/9 cm of water pressure. Patient spent 97 minutes at that pressure and their AHI was 1.9 which is considered treated obstructive sleep apnea.     Impression:  1.  Improvement respiratory events and oxygenation with BiPAP therapy  2.  With pressure increases oxygenation appeared to increase as well  3.  Meets criteria for mild nocturnal hypoxia with minimal oxygen saturation 81% time at or below 88% saturation of 30.5 minutes while on BiPAP  4.  Supine REM sleep while on BiPAP therapy    Recommendations:  I recommend auto BiPAP EPAP 7 IPAP 14 pressure support 4cm .  Patient used a small AirFit N 30i mask during night of study.  Once stable on Pap therapy would recommend overnight pulse oximetry  study.     I also recommend 30 day compliance download to assess the efficacy to the recommended pressure, measure leak, apnea hypopnea index and compliance for further outpatient monitoring and management of PAP therapy. In some cases alternative treatment options may be proven effective in resolving sleep apnea. These options include upper airway surgery, the use of a dental orthotic, weight loss, or positional therapy. Clinical correlation is required. In general patients with sleep apnea are advised to avoid alcohol, sedatives and not to operate a motor vehicle while drowsy.  Untreated sleep apnea increases the risk for cardiovascular and neurovascular disease.

## 2022-12-19 ENCOUNTER — APPOINTMENT (OUTPATIENT)
Dept: SLEEP MEDICINE | Facility: MEDICAL CENTER | Age: 62
End: 2022-12-19
Payer: COMMERCIAL

## 2022-12-20 ENCOUNTER — TELEPHONE (OUTPATIENT)
Dept: SLEEP MEDICINE | Facility: MEDICAL CENTER | Age: 62
End: 2022-12-20
Payer: COMMERCIAL

## 2022-12-20 NOTE — TELEPHONE ENCOUNTER
12-20-22  1st NO SHOW  Date of No Show: 12-19-22  Provider: Deepti Munroe  Reason For Visit: SS Results  Outcome of call:  no answer LVM.  Left call back for scheduling [phone].

## 2023-01-04 ENCOUNTER — TELEMEDICINE (OUTPATIENT)
Dept: SLEEP MEDICINE | Facility: MEDICAL CENTER | Age: 63
End: 2023-01-04
Payer: COMMERCIAL

## 2023-01-04 VITALS — WEIGHT: 237 LBS | BODY MASS INDEX: 33.93 KG/M2 | HEIGHT: 70 IN

## 2023-01-04 DIAGNOSIS — G47.33 OSA (OBSTRUCTIVE SLEEP APNEA): Primary | ICD-10-CM

## 2023-01-04 PROCEDURE — 99213 OFFICE O/P EST LOW 20 MIN: CPT | Mod: 95 | Performed by: STUDENT IN AN ORGANIZED HEALTH CARE EDUCATION/TRAINING PROGRAM

## 2023-01-04 NOTE — PROGRESS NOTES
[facility] Virtual Follow Up Note     Date: 2023 / Time: 1:56 PM    CC: Telemedicine sleep consultation    This sleep consultation is provided using Telemedicine and secure encrypted software. Consent was obtained.    Consulting MD: Lars Peck M.D.  Requesting Physician: Patrick Ansari M.D.  Patient name:      Zoltan Shin  : 1960  MRN: [id number]     This visit was conducted via Zoom using secure and encrypted videoconferencing technology.   The patient was in a private location at home in the [facility].    The patient's identity was confirmed and verbal consent was obtained for this virtual visit.      HISTORY OF PRESENT ILLNESS:     Zoltan Shin is a 62 y.o. male with hypertension, chronic pain, obesity, insomnia, fatigue, GERD, bowel perforation status post ileostomy, and obstructive sleep apnea.  Presents today to discuss recent PSG titration study.    He reports the night of the sleep study was not the best night sleep.  States he still felt he woke up feeling as though the pressure was too high and was difficult to breathe at times.  He was hopeful that BiPAP would have a dramatic difference from CPAP.  He is still somewhat hesitant to use CPAP at home.    He does report having some difficulty regarding his ileostomy.  States it has been frustrating in the past few months.      MEDICAL HISTORY  Past Medical History:   Diagnosis Date    Abdominal pain     Anesthesia     difficulty waking up, over sedation    Apnea, sleep     Arthritis     knees, right foot, right ankle    Back pain     Back pain     Bowel habit changes     constipation    Chest tightness     Daytime sleepiness     Earache     Environmental and seasonal allergies     Fatigue     Frequent headaches     Gasping for breath     Heart burn     Hypertension     Insomnia     Morning headache     Obesity     Pain 2016    bilat knees, right ankle & foot, hips, 8/10    Painful joint      Psychiatric problem     anxiety/depression    Shortness of breath     Sleep apnea     CPAP- not using now, causes sinus infection    Snoring     Sore muscles     Wears glasses         SURGICAL HISTORY  Past Surgical History:   Procedure Laterality Date    ANKLE TOTAL ARTHROPLASTY Right 02/14/2019    Procedure: ANKLE TOTAL ARTHROPLASTY ; GASTROC SOLEUS RECESSION;  Surgeon: Sonido Lujan M.D.;  Location: SURGERY [facility];  Service: Orthopedics    ANKLE ARTHROSCOPY Right 08/23/2016    Procedure: ANKLE ARTHROSCOPY;  Surgeon: Sonido Lujan M.D.;  Location: SURGERY [facility];  Service:     HARDWARE REMOVAL ORTHO Right 08/23/2016    Procedure: HARDWARE REMOVAL ORTHO cannulated screws;  Surgeon: Sonido Lujan M.D.;  Location: SURGERY [facility];  Service:     LIGAMENT REPAIR Right 08/23/2016    Procedure: LIGAMENT REPAIR Lateral Stabilization,  and Peroneal Tenosynovectomy with repair;  Surgeon: Sonido Lujan M.D.;  Location: SURGERY [facility];  Service:     GASTRIC BYPASS LAPAROSCOPIC  2011    KNEE REPLACEMENT, TOTAL Left 2011    ANKLE ARTHROSCOPY Right 2012/2014 x2/2015    ARTHROSCOPY, KNEE      COLON RESECTION      GASTROSTOMY      SLEEVE,KEVIN VASO THIGH          FAMILY HISTORY  Family History   Problem Relation Age of Onset    Hypertension Father        SOCIAL HISTORY  Social History     Socioeconomic History    Marital status:    Tobacco Use    Smoking status: Never    Smokeless tobacco: Former     Types: Chew    Tobacco comments:     chewed for a bit quit 2015   Vaping Use    Vaping Use: Never used   Substance and Sexual Activity    Alcohol use: No    Drug use: Never        CURRENT MEDICATIONS  Current Outpatient Medications   Medication Sig Dispense Refill    albuterol 108 (90 Base) MCG/ACT Aero Soln inhalation aerosol Inhale 2 Puffs every 6 hours as needed for Shortness of Breath.      vitamin D, Ergocalciferol, (DRISDOL) 38549 units Cap capsule Take 1 Capsule by  mouth every 7 days.      furosemide (LASIX) 40 MG Tab Take 1.5 Tablets by mouth 1 time a day as needed.      guaiFENesin (ROBITUSSIN) 100 MG/5ML Solution Take 5 mL by mouth every 6 hours as needed.      guaifenesin 200 MG tablet Take 1 Tablet by mouth every 8 hours as needed.      Skin Protectants, Misc. (EUCERIN) Cream Apply 1 Application to affected area(s) 1 time daily as needed. Dry skin      LACTOBACILLUS PO Take 1 Cap by mouth 3 times a day as needed.      menthol-methyl salicycate (MUSCLE RUB) 10-15 % Cream Apply 1 Application to affected area(s) 2 times a day as needed.      metoprolol (LOPRESSOR) 25 MG Tab Take 1 Tablet by mouth 2 times a day.      omeprazole (PRILOSEC) 20 MG delayed-release capsule Take 1 Capsule by mouth every day.      potassium chloride SA (KDUR) 20 MEQ Tab CR Take 2 Tablets by mouth every day. Dissolved in 4 ounces of water      prazosin (MINIPRESS) 1 MG Cap Take 1-2 Capsules by mouth at bedtime as needed.      pregabalin (LYRICA) 100 MG Cap Take 1 Capsule by mouth 3 times a day.      tadalafil (CIALIS) 5 MG tablet Take 4 Tablets by mouth as needed for Erectile Dysfunction.      testosterone cypionate (DEPO-TESTOSTERONE) 200 MG/ML Solution injection Inject 1 mL into the shoulder, thigh, or buttocks see administration instructions. Every 10 days      traZODone (DESYREL) 50 MG Tab Take 1 Tablet by mouth every evening.      B Complex Vitamins (VITAMIN-B COMPLEX PO) Take 1 Cap by mouth 2 times a day.      ascorbic acid (VITAMIN C) 250 MG tablet Take 1 Tablet by mouth every day.      anastrozole (ARIMIDEX) 1 MG Tab Take 1 Tablet by mouth every Monday, Wednesday, and Friday.      aspirin EC (ECOTRIN) 81 MG Tablet Delayed Response Take 1 Tablet by mouth 2 times a day.      sennosides (SENOKOT) 8.6 MG Tab Take 1 Tablet by mouth 1 time a day as needed.      Cyanocobalamin (VITAMIN B-12) 1000 MCG Tab Take 1 Tablet by mouth every day.      fluticasone (FLONASE) 50 MCG/ACT nasal spray Administer 1  "Spray into affected nostril(S) every day. Indications: Hayfever      triamcinolone acetonide (KENALOG) 0.025 % Cream Apply 1 Application to affected area(s) as needed.      clotrimazole (LOTRIMIN) 1 % Cream Apply 1 Application topically 2 times a day.      Loratadine 10 MG Cap Take 1 Cap by mouth every day.      cyclobenzaprine (FLEXERIL) 10 MG Tab Take 1 Tablet by mouth 2 times a day as needed. Indications: Muscle Spasm      sodium chloride (OCEAN) 0.65 % Solution Administer 1 Spray into affected nostril(S) as needed for Congestion.       No current facility-administered medications for this visit.       REVIEW OF SYSTEMS  Constitutional: Denies fevers, Denies weight changes  Ears/Nose/Throat/Mouth: Denies nasal congestion or sore throat   Cardiovascular: Denies chest pain or palpitations   Respiratory: Denies shortness of breath, Denies cough  Gastrointestinal/Hepatic: Denies abdominal pain, nausea, vomiting, diarrhea  Musculoskeletal/Rheum: Denies  joint pain and swelling   Sleep: See HPI      Physical Examination:  Vitals/ General Appearance:   Weight/BMI: Body mass index is 34.01 kg/m².  Ht 1.778 m (5' 10\")   Wt 108 kg (237 lb)   Vitals:    01/04/23 1352   Weight: 108 kg (237 lb)   Height: 1.778 m (5' 10\")       General: alert and oriented to time, place and person. Cooperative and in no apparent distress.   Constitutional: Alert, no distress, well-groomed.  Voice: normal volume and linden  Head: normocephalic   Pulmonary: Voice normal volume and linden. No sounds or signs of increased work of breathing.   Neurologic: No involuntary movements     Assessment and Plan  Zoltan Shin is a 62 y.o. male with hypertension, chronic pain, obesity, insomnia, fatigue, GERD, bowel perforation status post ileostomy, and obstructive sleep apnea.  Presents today to discuss recent PSG titration study.    Obstructive sleep apnea   Reviewed recent PSG titration study with patient showing improvement in respiratory " events as well as oxygenation with BiPAP.  Reviewed that he had an improved sleep efficiency of 88%.  He seemed to do well with BiPAP therapy despite his perception of the night of study.  He states he was surprised to learn of the numbers and is interested in trying BiPAP therapy at home.  Reviewed importance of having comfortable mask as well as using the ramp feature.   We discussed the pathophysiology of obstructive sleep apnea (PAYTON) and risk factors for the disease. We also discussed possible consequences of untreated PAYTON, including excessive daytime sleepiness and fatigue, cognitive dysfunction, cardiovascular complications such as elevated blood pressure, heart attacks, cardiac arrhythmias, and strokes.   We will proceed with BiPAP therapy.    RECOMMENDATIONS  -Start Auto BiPAP EPAP 7 IPAP 13 pressure support 4  -Once stable on BiPAP therapy would recommend overnight pulse oximetry study  -Discussed importance of adherence/compliance   -Prescription generated for supplies   -Patient counseled to avoid driving when sleepy. Encouraged to anticipate sleepiness, consider taking a 10 min nap prior to driving, alternate with another , or pull over if sleepy while driving  -Advised to contact our office or myself with any questions via MyChart    Positive airway pressure will favorably impact many of the adverse conditions and effects provoked by PAYTON.    Have advised the patient to follow up with the appropriate healthcare practitioners for all other medical problems and issues.    Return for 1-2 months after starting PAP therapy.      Please note portions of this record was created using voice recognition software. I have made every reasonable attempt to correct obvious errors, but I expect that there are errors of grammar and possibly content I did not discover before finalizing the note.

## 2023-03-02 ENCOUNTER — PRE-ADMISSION TESTING (OUTPATIENT)
Dept: ADMISSIONS | Facility: MEDICAL CENTER | Age: 63
End: 2023-03-02
Attending: COLON & RECTAL SURGERY
Payer: COMMERCIAL

## 2023-03-03 ENCOUNTER — ANESTHESIA (OUTPATIENT)
Dept: SURGERY | Facility: MEDICAL CENTER | Age: 63
End: 2023-03-03
Payer: COMMERCIAL

## 2023-03-03 ENCOUNTER — ANESTHESIA EVENT (OUTPATIENT)
Dept: SURGERY | Facility: MEDICAL CENTER | Age: 63
End: 2023-03-03
Payer: COMMERCIAL

## 2023-03-03 ENCOUNTER — HOSPITAL ENCOUNTER (OUTPATIENT)
Facility: MEDICAL CENTER | Age: 63
End: 2023-03-03
Attending: COLON & RECTAL SURGERY | Admitting: COLON & RECTAL SURGERY
Payer: COMMERCIAL

## 2023-03-03 VITALS
SYSTOLIC BLOOD PRESSURE: 141 MMHG | WEIGHT: 259.04 LBS | TEMPERATURE: 96.8 F | DIASTOLIC BLOOD PRESSURE: 85 MMHG | OXYGEN SATURATION: 92 % | HEIGHT: 70 IN | BODY MASS INDEX: 37.08 KG/M2 | HEART RATE: 69 BPM | RESPIRATION RATE: 16 BRPM

## 2023-03-03 LAB
ANION GAP SERPL CALC-SCNC: 11 MMOL/L (ref 7–16)
BUN SERPL-MCNC: 13 MG/DL (ref 8–22)
CALCIUM SERPL-MCNC: 9.4 MG/DL (ref 8.5–10.5)
CHLORIDE SERPL-SCNC: 106 MMOL/L (ref 96–112)
CO2 SERPL-SCNC: 23 MMOL/L (ref 20–33)
CREAT SERPL-MCNC: 0.97 MG/DL (ref 0.5–1.4)
EKG IMPRESSION: NORMAL
GFR SERPLBLD CREATININE-BSD FMLA CKD-EPI: 87 ML/MIN/1.73 M 2
GLUCOSE SERPL-MCNC: 103 MG/DL (ref 65–99)
PATHOLOGY CONSULT NOTE: NORMAL
POTASSIUM SERPL-SCNC: 3.8 MMOL/L (ref 3.6–5.5)
SODIUM SERPL-SCNC: 140 MMOL/L (ref 135–145)

## 2023-03-03 PROCEDURE — 88341 IMHCHEM/IMCYTCHM EA ADD ANTB: CPT

## 2023-03-03 PROCEDURE — 160048 HCHG OR STATISTICAL LEVEL 1-5: Performed by: COLON & RECTAL SURGERY

## 2023-03-03 PROCEDURE — 160002 HCHG RECOVERY MINUTES (STAT): Performed by: COLON & RECTAL SURGERY

## 2023-03-03 PROCEDURE — 80048 BASIC METABOLIC PNL TOTAL CA: CPT

## 2023-03-03 PROCEDURE — 160025 RECOVERY II MINUTES (STATS): Performed by: COLON & RECTAL SURGERY

## 2023-03-03 PROCEDURE — 00902 ANES ANORECTAL PX: CPT | Performed by: ANESTHESIOLOGY

## 2023-03-03 PROCEDURE — 160046 HCHG PACU - 1ST 60 MINS PHASE II: Performed by: COLON & RECTAL SURGERY

## 2023-03-03 PROCEDURE — 700111 HCHG RX REV CODE 636 W/ 250 OVERRIDE (IP): Performed by: ANESTHESIOLOGY

## 2023-03-03 PROCEDURE — 93010 ELECTROCARDIOGRAM REPORT: CPT | Performed by: INTERNAL MEDICINE

## 2023-03-03 PROCEDURE — 88305 TISSUE EXAM BY PATHOLOGIST: CPT

## 2023-03-03 PROCEDURE — 93005 ELECTROCARDIOGRAM TRACING: CPT | Performed by: COLON & RECTAL SURGERY

## 2023-03-03 PROCEDURE — 700105 HCHG RX REV CODE 258: Performed by: COLON & RECTAL SURGERY

## 2023-03-03 PROCEDURE — 160035 HCHG PACU - 1ST 60 MINS PHASE I: Performed by: COLON & RECTAL SURGERY

## 2023-03-03 PROCEDURE — 36415 COLL VENOUS BLD VENIPUNCTURE: CPT

## 2023-03-03 PROCEDURE — 88342 IMHCHEM/IMCYTCHM 1ST ANTB: CPT

## 2023-03-03 PROCEDURE — 160009 HCHG ANES TIME/MIN: Performed by: COLON & RECTAL SURGERY

## 2023-03-03 PROCEDURE — 160027 HCHG SURGERY MINUTES - 1ST 30 MINS LEVEL 2: Performed by: COLON & RECTAL SURGERY

## 2023-03-03 RX ORDER — HALOPERIDOL 5 MG/ML
1 INJECTION INTRAMUSCULAR
Status: DISCONTINUED | OUTPATIENT
Start: 2023-03-03 | End: 2023-03-03 | Stop reason: HOSPADM

## 2023-03-03 RX ORDER — METOPROLOL TARTRATE 1 MG/ML
1 INJECTION, SOLUTION INTRAVENOUS
Status: DISCONTINUED | OUTPATIENT
Start: 2023-03-03 | End: 2023-03-03 | Stop reason: HOSPADM

## 2023-03-03 RX ORDER — SODIUM CHLORIDE, SODIUM LACTATE, POTASSIUM CHLORIDE, CALCIUM CHLORIDE 600; 310; 30; 20 MG/100ML; MG/100ML; MG/100ML; MG/100ML
INJECTION, SOLUTION INTRAVENOUS CONTINUOUS
Status: DISCONTINUED | OUTPATIENT
Start: 2023-03-03 | End: 2023-03-03 | Stop reason: HOSPADM

## 2023-03-03 RX ORDER — DIPHENHYDRAMINE HYDROCHLORIDE 50 MG/ML
12.5 INJECTION INTRAMUSCULAR; INTRAVENOUS
Status: DISCONTINUED | OUTPATIENT
Start: 2023-03-03 | End: 2023-03-03 | Stop reason: HOSPADM

## 2023-03-03 RX ORDER — MEPERIDINE HYDROCHLORIDE 25 MG/ML
6.25 INJECTION INTRAMUSCULAR; INTRAVENOUS; SUBCUTANEOUS
Status: DISCONTINUED | OUTPATIENT
Start: 2023-03-03 | End: 2023-03-03 | Stop reason: HOSPADM

## 2023-03-03 RX ORDER — ONDANSETRON 2 MG/ML
4 INJECTION INTRAMUSCULAR; INTRAVENOUS
Status: DISCONTINUED | OUTPATIENT
Start: 2023-03-03 | End: 2023-03-03 | Stop reason: HOSPADM

## 2023-03-03 RX ORDER — LABETALOL HYDROCHLORIDE 5 MG/ML
5 INJECTION, SOLUTION INTRAVENOUS
Status: DISCONTINUED | OUTPATIENT
Start: 2023-03-03 | End: 2023-03-03 | Stop reason: HOSPADM

## 2023-03-03 RX ORDER — MIDAZOLAM HYDROCHLORIDE 1 MG/ML
INJECTION INTRAMUSCULAR; INTRAVENOUS PRN
Status: DISCONTINUED | OUTPATIENT
Start: 2023-03-03 | End: 2023-03-03 | Stop reason: SURG

## 2023-03-03 RX ORDER — AMLODIPINE BESYLATE 5 MG/1
5 TABLET ORAL DAILY
Status: ON HOLD | COMMUNITY
End: 2024-02-06

## 2023-03-03 RX ORDER — EPHEDRINE SULFATE 50 MG/ML
5 INJECTION, SOLUTION INTRAVENOUS
Status: DISCONTINUED | OUTPATIENT
Start: 2023-03-03 | End: 2023-03-03 | Stop reason: HOSPADM

## 2023-03-03 RX ORDER — HYDROCODONE BITARTRATE AND ACETAMINOPHEN 7.5; 325 MG/1; MG/1
1 TABLET ORAL EVERY 6 HOURS PRN
Status: ON HOLD | COMMUNITY
End: 2024-02-06

## 2023-03-03 RX ORDER — TAMSULOSIN HYDROCHLORIDE 0.4 MG/1
0.4 CAPSULE ORAL
COMMUNITY

## 2023-03-03 RX ADMIN — MIDAZOLAM HYDROCHLORIDE 2 MG: 1 INJECTION, SOLUTION INTRAMUSCULAR; INTRAVENOUS at 07:28

## 2023-03-03 RX ADMIN — PROPOFOL 20 MG: 10 INJECTION, EMULSION INTRAVENOUS at 07:37

## 2023-03-03 RX ADMIN — SODIUM CHLORIDE, POTASSIUM CHLORIDE, SODIUM LACTATE AND CALCIUM CHLORIDE: 600; 310; 30; 20 INJECTION, SOLUTION INTRAVENOUS at 06:55

## 2023-03-03 RX ADMIN — PROPOFOL 40 MG: 10 INJECTION, EMULSION INTRAVENOUS at 07:39

## 2023-03-03 RX ADMIN — PROPOFOL 20 MG: 10 INJECTION, EMULSION INTRAVENOUS at 07:44

## 2023-03-03 RX ADMIN — PROPOFOL 20 MG: 10 INJECTION, EMULSION INTRAVENOUS at 07:42

## 2023-03-03 RX ADMIN — SODIUM CHLORIDE, POTASSIUM CHLORIDE, SODIUM LACTATE AND CALCIUM CHLORIDE: 600; 310; 30; 20 INJECTION, SOLUTION INTRAVENOUS at 07:28

## 2023-03-03 ASSESSMENT — PAIN DESCRIPTION - PAIN TYPE
TYPE: SURGICAL PAIN

## 2023-03-03 ASSESSMENT — PAIN SCALES - GENERAL: PAIN_LEVEL: 1

## 2023-03-03 NOTE — DISCHARGE INSTRUCTIONS
HOME CARE INSTRUCTIONS    ACTIVITY: Rest and take it easy for the first 24 hours.  A responsible adult is recommended to remain with you during that time.  It is normal to feel sleepy.  We encourage you to not do anything that requires balance, judgment or coordination.    FOR 24 HOURS DO NOT:  Drive, operate machinery or run household appliances.  Drink beer or alcoholic beverages.  Make important decisions or sign legal documents.    SPECIAL INSTRUCTIONS: Sigmoidoscopy/Colonoscopy Discharge instructions:    1. DIET: Upon discharge from the hospital you may resume your normal preoperative diet. Depending on how you are feeling and whether you have nausea or not, you may wish to stay with a bland diet for the first few days. However, you can advance this as quickly as you feel ready.    2. ACTIVITIES: Upon discharge from the hospital, the day of surgery it is requested that you do no significant physical activity and limit mental activities, as you have had sedation. The day after discharge, you may resume full routine activities.     3. DRIVING: No driving today after anesthesia.     4. BOWEL FUNCTION: Constipation is common after receiving anesthesia.  If you feel this is occurring, take a laxative (Miralax, Milk of Magnesia, Ex-Lax, Senokot, etc.) until the problem has resolved.    5.CALL IF YOU HAVE: (1) Fevers to more than 101.0 F, (2) Unusual chest or leg pain, (3) Excessive bleeding or persistent nausea/vomiting, Please do not hesitate to call with any other questions.     6. APPOINTMENT: You should follow up with Gastroenterology for possible dilations of the a stricture. [facility], [facility] refer you to Gastroenterology in [facility]. A biopsy was performed, Dr. Stack's office will receive a copy of the report and you can call the office in 1-2 weeks for the results. Contact our office at [phone] with any questions or concerns. Our office hours are Monday-Friday, 8am-4:30pm.   Please try to call during these hours when we are better able to assist you.    If you have any additional questions, please do not hesitate to call the office and speak to either myself or the physician on call.    Office address:  [address]  [address]  [place] [address]    MEDICATIONS: Resume taking daily medication.  Take prescribed pain medication with food.  If no medication is prescribed, you may take non-aspirin pain medication if needed.  PAIN MEDICATION CAN BE VERY CONSTIPATING.  Take a stool softener or laxative such as senokot, pericolace, or milk of magnesia if needed.    A follow-up appointment should be arranged with your doctor; call to schedule.    You should call 911 if you develop problems with breathing or chest pain.  If you are unable to contact your doctor or surgical center, you should go to the nearest emergency room or urgent care center.  Physician's telephone #: [phone]    MILD FLU-LIKE SYMPTOMS ARE NORMAL.  YOU MAY EXPERIENCE GENERALIZED MUSCLE ACHES, THROAT IRRITATION, HEADACHE AND/OR SOME NAUSEA.    If any questions arise, call your doctor.  If your doctor is not available, please feel free to call the Surgical Center at [phone].  The Center is open Monday through Friday from 7AM to 7PM.      A registered nurse may call you a few days after your surgery to see how you are doing after your procedure.    You may also receive a survey in the mail within the next two weeks and we ask that you take a few moments to complete the survey and return it to us.  Our goal is to provide you with very good care and we value your comments.     Depression / Suicide Risk    As you are discharged from this [facility], it is important to learn how to keep safe from harming yourself.    Recognize the warning signs:  Abrupt changes in personality, positive or negative- including increase in energy   Giving away possessions  Change in eating patterns-  significant weight changes-  positive or negative  Change in sleeping patterns- unable to sleep or sleeping all the time   Unwillingness or inability to communicate  Depression  Unusual sadness, discouragement and loneliness  Talk of wanting to die  Neglect of personal appearance   Rebelliousness- reckless behavior  Withdrawal from people/activities they love  Confusion- inability to concentrate     If you or a loved one observes any of these behaviors or has concerns about self-harm, here's what you can do:  Talk about it- your feelings and reasons for harming yourself  Remove any means that you might use to hurt yourself (examples: pills, rope, extension cords, firearm)  Get professional help from the community (Mental Health, Substance Abuse, psychological counseling)  Do not be alone:Call your Safe Contact- someone whom you trust who will be there for you.  Call your local CRISIS HOTLINE 563-3918 or 778-928-3164  Call your local Children's Mobile Crisis Response Team [place] (423) 945-0407 or www.[employer]  Call the toll free National Suicide Prevention Hotlines   National Suicide Prevention Lifeline 146-298-IXIQ (4882)  National Hope Line Network 800-SUICIDE (467-1805)    I acknowledge receipt and understanding of these Home Care instructions.

## 2023-03-03 NOTE — ANESTHESIA POSTPROCEDURE EVALUATION
Patient: Zoltan Shin    Procedure Summary     Date: 03/03/23 Room / Location: [facility] 09 / SURGERY [facility]    Anesthesia Start: 0728 Anesthesia Stop: 0755    Procedure: COLONOSCOPY, BIOPSY OF RECTAL STRICTURE (Anus) Diagnosis: (RECTAL PAIN)    Surgeons: Louis Stack M.D. Responsible Provider: Sangita French M.D.    Anesthesia Type: MAC ASA Status: 2          Final Anesthesia Type: MAC  Last vitals  BP   Blood Pressure: 116/70    Temp   36.1 °C (97 °F)    Pulse   77   Resp   15    SpO2   97 %      Anesthesia Post Evaluation    Patient location during evaluation: PACU  Patient participation: complete - patient participated  Level of consciousness: awake and alert  Pain score: 1    Airway patency: patent  Anesthetic complications: no  Cardiovascular status: adequate and hemodynamically stable  Respiratory status: acceptable  Hydration status: acceptable    PONV: none          No notable events documented.     Nurse Pain Score: 1 (NPRS)

## 2023-03-03 NOTE — OR NURSING
Pt verbalizes readiness for discharge. Instructions reviewed with pt by Archana ESPARZA. No further needs. Pt taken to car via wheelchair by CNA.

## 2023-03-03 NOTE — OR NURSING
Pt is aaox4, resting comfortably in [facility] on room air. His respirations are equal and unlabored, he is in no acute distress. He does not complain of pain at this time. He drinks water without difficulty or complaints of n/v. Will continue to monitor.

## 2023-03-03 NOTE — ANESTHESIA TIME REPORT
Anesthesia Start and Stop Event Times     Date Time Event    3/3/2023 0727 Ready for Procedure     0728 Anesthesia Start     0755 Anesthesia Stop        Responsible Staff  03/03/23    Name Role Begin End    Sangita French M.D. Anesth 0728 0750        Overtime Reason:  no overtime (within assigned shift)    Comments:

## 2023-03-03 NOTE — OR NURSING
Assume care for pt in pre-op. Patient allergies and NPO status verified. Belongings secured. Patient verbalizes understanding of pain scale, expected course of stay and plan of care. Surgical site verified with patient. IV access established. Blood samples sent to lab for bmp. Call light within reach. No further needs at this time. Hourly rounding in place.

## 2023-03-03 NOTE — ANESTHESIA PREPROCEDURE EVALUATION
Case: [id number] Date/Time: 03/03/23 0730    Procedure: FLEXIBLE SIGMOIDOSCOPY WITH POSSIBLE STAPLE REMOVAL    Pre-op diagnosis: RECTAL PAIN    Location: [facility] OR 09 / SURGERY [facility]    Surgeons: Louis Stack M.D.        62yoM with PMHx of HTN, GERD, PAYTON    +allergy to oxycodone  NPO  +difficulty urinating postop, slow to wakeup    Relevant Problems   No relevant active problems       Physical Exam    Airway   Mallampati: III       Cardiovascular - normal exam     Dental - normal exam           Pulmonary - normal exam     Abdominal   (+) obese     Neurological - normal exam                 Anesthesia Plan    ASA 2       Plan - MAC               Induction: intravenous    Postoperative Plan: Postoperative administration of opioids is intended.    Pertinent diagnostic labs and testing reviewed    Informed Consent:    Anesthetic plan and risks discussed with patient.

## 2023-03-04 NOTE — OP REPORT
DATE OF SERVICE:  03/03/2023     PREOPERATIVE DIAGNOSES:  1.  Severe rectal pain.  2.  Attention to post-surgical colorectal anatomy.     OPERATIONS PERFORMED:  1.  Colonoscopy to transverse colon using 9 mm flexible sigmoidoscope.  2.  Biopsy of rectal stricture at 15 cm.     SURGEON:  Louis Stack MD     ASSISTANT:  Sangita French MD     INDICATIONS FOR PROCEDURE:  The patient is a 62-year-old male with a complex   colorectal surgical history, currently has an ileostomy and a reconstructed   colon with a colorectal anastomosis.  He has had severe pain and desires to   have his ileostomy reversed.  He comes today for evaluation of the colon and   rectum using the flexible sigmoidoscope.     DETAILS OF PROCEDURE:  After an extensive informed consent discussion process,   the patient was brought to the operating room.  He was placed in a supine   position on the operating table. Careful digital rectal exam was performed,   which demonstrated normal tone, no palpable staple line.  The flexible   colonoscope was advanced into the rectum.  It was advanced to what initially   appeared to be a blind end at 15 cm.  However, with gentle forward pressure   and some gradual exploration, there proved to be very tight stricture in a   narrow lumen through which the pediatric colonoscope eventually was able to   pass with some mild difficulty.  The pediatric colonoscope was then advanced   slowly.  There were good deal of retained mucus balls throughout the colon and   we continued up until approximately 70 cm well into the transverse colon, at   which point, the retained mucus and fecal material ____ the scope and became   impossible to see our advance.  Using a good deal of irrigation for the   inspection, gradually, the colonoscope was withdrawn.  The vascular pattern   appeared normal.  There were no other lesions.  The tight stricture was   inspected at 15 cm.  A biopsy of the exuberant tissue was obtained and sent    for pathology.  Careful inspection demonstrated excellent hemostasis.  The   colonoscope was then slowly withdrawn.     IMPRESSION:  1.  Tight stricture at 15 cm.  2.  Copious retained mucus material throughout colon.     PLAN:  Consideration of a colonoscopic dilatation.  No role for ileostomy   reversal at this time given tight stricture.        ______________________________  MD KAROL JOHNSON/SADIQ/RIGO    DD:  03/03/2023 14:22  DT:  03/03/2023 17:51    Job#:  [id number]

## 2023-04-06 ENCOUNTER — HOSPITAL ENCOUNTER (OUTPATIENT)
Dept: RADIOLOGY | Facility: MEDICAL CENTER | Age: 63
End: 2023-04-06
Attending: FAMILY MEDICINE
Payer: COMMERCIAL

## 2023-04-06 DIAGNOSIS — M79.9 DISORDER OF SOFT TISSUE: ICD-10-CM

## 2024-01-18 ENCOUNTER — HOSPITAL ENCOUNTER (OUTPATIENT)
Dept: RADIOLOGY | Facility: MEDICAL CENTER | Age: 64
End: 2024-01-18
Attending: FAMILY MEDICINE

## 2024-01-18 ENCOUNTER — HOSPITAL ENCOUNTER (OUTPATIENT)
Dept: RADIOLOGY | Facility: MEDICAL CENTER | Age: 64
End: 2024-01-18

## 2024-01-18 ENCOUNTER — OFFICE VISIT (OUTPATIENT)
Dept: SURGICAL ONCOLOGY | Facility: MEDICAL CENTER | Age: 64
End: 2024-01-18
Payer: COMMERCIAL

## 2024-01-18 VITALS
OXYGEN SATURATION: 99 % | HEART RATE: 84 BPM | BODY MASS INDEX: 37.17 KG/M2 | DIASTOLIC BLOOD PRESSURE: 78 MMHG | HEIGHT: 70 IN | TEMPERATURE: 98.4 F | SYSTOLIC BLOOD PRESSURE: 112 MMHG

## 2024-01-18 DIAGNOSIS — M79.89 SOFT TISSUE MASS: ICD-10-CM

## 2024-01-18 PROCEDURE — 3078F DIAST BP <80 MM HG: CPT | Performed by: ORTHOPAEDIC SURGERY

## 2024-01-18 PROCEDURE — 99204 OFFICE O/P NEW MOD 45 MIN: CPT | Performed by: ORTHOPAEDIC SURGERY

## 2024-01-18 PROCEDURE — 3074F SYST BP LT 130 MM HG: CPT | Performed by: ORTHOPAEDIC SURGERY

## 2024-01-18 ASSESSMENT — ENCOUNTER SYMPTOMS
SENSORY CHANGE: 0
WEIGHT LOSS: 0
FEVER: 0
CHILLS: 0
MYALGIAS: 0
SHORTNESS OF BREATH: 0
ABDOMINAL PAIN: 0
WEAKNESS: 0

## 2024-01-18 NOTE — PROGRESS NOTES
Subjective:   1/18/2024  9:05 AM  Primary care physician:Pcp Not In Computer    Chief Complaint: right arm mass    History of presenting illness:  Zoltan Shin  is a pleasant 63 y.o. male who referred for evaluation of right arm mass.  The mass has been present for at least 3 years.  He states that it just appeared one day and has slowly grown with time.  He did have several evaluations over this time.  It was recommended he see an orthopedic oncologist, however, he was dealing with other health concerns at the time and was unable to travel.  He also has a rotator cuff tear in the right shoulder and would like to have that fixed, but his shoulder surgeon would prefer he get the mass taken care of first.  He denies any numbness or paresthesias down the arm.  He denies any personal history of cancer or family history of cancer.    Occupation: Retired    Past Medical History:   Diagnosis Date    Abdominal pain     Adverse effect of anesthesia     unable to urinate post-op    Anesthesia     difficulty waking up, over sedation    Apnea, sleep     Arthritis     knees, right foot, right ankle    Back pain     Back pain     Bowel habit changes     constipation    Chest tightness     Daytime sleepiness     Earache     Environmental and seasonal allergies     Fatigue     Frequent headaches     Gasping for breath     Heart burn     Hypertension     Insomnia     Morning headache     Obesity     Pain 08/19/2016    bilat knees, right ankle & foot, hips, 8/10    Painful joint     Psychiatric problem     anxiety/depression    Shortness of breath     Sleep apnea     CPAP- not using now, causes sinus infection    Snoring     Sore muscles     Wears glasses      Past Surgical History:   Procedure Laterality Date    NE SIGMOIDOSCOPY,DIAGNOSTIC N/A 3/3/2023    Procedure: COLONOSCOPY, BIOPSY OF RECTAL STRICTURE;  Surgeon: Louis Stack M.D.;  Location: SURGERY [facility];  Service: General    ANKLE TOTAL ARTHROPLASTY Right  02/14/2019    Procedure: ANKLE TOTAL ARTHROPLASTY ; GASTROC SOLEUS RECESSION;  Surgeon: Sonido Lujan M.D.;  Location: SURGERY [facility];  Service: Orthopedics    ANKLE ARTHROSCOPY Right 08/23/2016    Procedure: ANKLE ARTHROSCOPY;  Surgeon: Sonido Lujan M.D.;  Location: SURGERY [facility];  Service:     HARDWARE REMOVAL ORTHO Right 08/23/2016    Procedure: HARDWARE REMOVAL ORTHO cannulated screws;  Surgeon: Sonido Lujan M.D.;  Location: SURGERY [facility];  Service:     LIGAMENT REPAIR Right 08/23/2016    Procedure: LIGAMENT REPAIR Lateral Stabilization,  and Peroneal Tenosynovectomy with repair;  Surgeon: Sonido Lujan M.D.;  Location: SURGERY [facility];  Service:     GASTRIC BYPASS LAPAROSCOPIC  2011    KNEE REPLACEMENT, TOTAL Left 2011    ANKLE ARTHROSCOPY Right 2012/2014 x2/2015    ARTHROSCOPY, KNEE      COLON RESECTION      GASTROSTOMY      SLEEVE,KEVIN VASO THIGH       Allergies   Allergen Reactions    Oxycodone Vomiting     Violent Nausea/Vomiting     Outpatient Encounter Medications as of 1/18/2024   Medication Sig Dispense Refill    HYDROcodone-acetaminophen (NORCO) 7.5-325 MG tab Take 1 Tablet by mouth every 6 hours as needed for Moderate Pain.      amLODIPine (NORVASC) 5 MG Tab Take 5 mg by mouth every day.      tamsulosin (FLOMAX) 0.4 MG capsule Take 0.4 mg by mouth 1/2 hour after breakfast.      albuterol 108 (90 Base) MCG/ACT Aero Soln inhalation aerosol Inhale 2 Puffs every 6 hours as needed for Shortness of Breath.      vitamin D, Ergocalciferol, (DRISDOL) 19918 units Cap capsule Take 1 Capsule by mouth every 7 days.      furosemide (LASIX) 40 MG Tab Take 1.5 Tablets by mouth 1 time a day as needed.      guaiFENesin (ROBITUSSIN) 100 MG/5ML Solution Take 5 mL by mouth every 6 hours as needed.      guaifenesin 200 MG tablet Take 1 Tablet by mouth every 8 hours as needed.      Skin Protectants, Misc. (EUCERIN) Cream Apply 1 Application to affected area(s) 1 time daily  as needed. Dry skin      LACTOBACILLUS PO Take 1 Cap by mouth 3 times a day as needed.      menthol-methyl salicycate (MUSCLE RUB) 10-15 % Cream Apply 1 Application to affected area(s) 2 times a day as needed.      metoprolol (LOPRESSOR) 25 MG Tab Take 1 Tablet by mouth 2 times a day.      omeprazole (PRILOSEC) 20 MG delayed-release capsule Take 1 Capsule by mouth every day.      potassium chloride SA (KDUR) 20 MEQ Tab CR Take 2 Tablets by mouth every day. Dissolved in 4 ounces of water      prazosin (MINIPRESS) 1 MG Cap Take 1-2 Capsules by mouth at bedtime as needed.      pregabalin (LYRICA) 100 MG Cap Take 1 Capsule by mouth 3 times a day.      tadalafil (CIALIS) 5 MG tablet Take 4 Tablets by mouth as needed for Erectile Dysfunction.      testosterone cypionate (DEPO-TESTOSTERONE) 200 MG/ML Solution injection Inject 1 mL into the shoulder, thigh, or buttocks see administration instructions. Every 10 days      traZODone (DESYREL) 50 MG Tab Take 1 Tablet by mouth every evening.      B Complex Vitamins (VITAMIN-B COMPLEX PO) Take 1 Cap by mouth 2 times a day.      ascorbic acid (VITAMIN C) 250 MG tablet Take 1 Tablet by mouth every day.      anastrozole (ARIMIDEX) 1 MG Tab Take 1 Tablet by mouth every Monday, Wednesday, and Friday.      aspirin EC (ECOTRIN) 81 MG Tablet Delayed Response Take 1 Tablet by mouth 2 times a day.      sennosides (SENOKOT) 8.6 MG Tab Take 1 Tablet by mouth 1 time a day as needed.      Cyanocobalamin (VITAMIN B-12) 1000 MCG Tab Take 1 Tablet by mouth every day.      fluticasone (FLONASE) 50 MCG/ACT nasal spray Administer 1 Spray into affected nostril(S) every day. Indications: Hayfever      triamcinolone acetonide (KENALOG) 0.025 % Cream Apply 1 Application to affected area(s) as needed.      clotrimazole (LOTRIMIN) 1 % Cream Apply 1 Application topically 2 times a day.      Loratadine 10 MG Cap Take 1 Cap by mouth every day.      cyclobenzaprine (FLEXERIL) 10 MG Tab Take 1 Tablet by mouth  2 times a day as needed. Indications: Muscle Spasm      sodium chloride (OCEAN) 0.65 % Solution Administer 1 Spray into affected nostril(S) as needed for Congestion.       No facility-administered encounter medications on file as of 1/18/2024.     Social History     Socioeconomic History    Marital status:      Spouse name: Not on file    Number of children: Not on file    Years of education: Not on file    Highest education level: Not on file   Occupational History    Not on file   Tobacco Use    Smoking status: Never    Smokeless tobacco: Former     Types: Chew    Tobacco comments:     chew   Vaping Use    Vaping Use: Never used   Substance and Sexual Activity    Alcohol use: No    Drug use: Never    Sexual activity: Not on file   Other Topics Concern    Not on file   Social History Narrative    Not on file     Social Determinants of Health     Financial Resource Strain: Not on file   Food Insecurity: Not on file   Transportation Needs: Not on file   Physical Activity: Not on file   Stress: Not on file   Social Connections: Not on file   Intimate Partner Violence: Not on file   Housing Stability: Not on file      Social History     Tobacco Use   Smoking Status Never   Smokeless Tobacco Former    Types: Chew   Tobacco Comments    chew     Social History     Substance and Sexual Activity   Alcohol Use No     Social History     Substance and Sexual Activity   Drug Use Never        Family History   Problem Relation Age of Onset    Hypertension Father        Review of Systems   Constitutional:  Negative for chills, fever and weight loss.   Respiratory:  Negative for shortness of breath.    Cardiovascular:  Negative for chest pain.   Gastrointestinal:  Negative for abdominal pain.   Musculoskeletal:  Positive for joint pain. Negative for myalgias.   Skin:  Negative for rash.   Neurological:  Negative for sensory change and weakness.        Objective:   /78 (BP Location: Left arm, Patient Position: Sitting,  "BP Cuff Size: Small adult)   Pulse 84   Temp 36.9 °C (98.4 °F) (Temporal)   Ht 1.778 m (5' 10\")   SpO2 99%   BMI 37.17 kg/m²     Physical Exam  Constitutional:       General: He is not in acute distress.     Appearance: Normal appearance.   HENT:      Head: Normocephalic and atraumatic.      Right Ear: External ear normal.      Left Ear: External ear normal.      Nose: Nose normal.      Mouth/Throat:      Mouth: Mucous membranes are moist.   Eyes:      Extraocular Movements: Extraocular movements intact.      Conjunctiva/sclera: Conjunctivae normal.   Cardiovascular:      Rate and Rhythm: Normal rate.      Pulses: Normal pulses.   Pulmonary:      Effort: Pulmonary effort is normal. No respiratory distress.      Breath sounds: No wheezing.   Abdominal:      General: Abdomen is flat. There is no distension.   Musculoskeletal:      Cervical back: Normal range of motion and neck supple.      Comments: Right upper extremity: No overlying skin lesions, erythema or ecchymoses.  There is a tattoo over the right arm.  Over the proximal lateral arm there is a soft, doughy subcutaneous mass that is mobile.  It is tender to palpation.  Sensation intact to light touch radial, median, ulnar and axillary nerves.  Motor intact to axillary, radial, AIN, PIN, median and ulnar nerves.  There is tenderness to palpation at the rotator cuff insertion of the greater tuberosity.  There is pain with resisted abduction of the shoulder.  Radial pulse 2+   Skin:     General: Skin is warm and dry.      Capillary Refill: Capillary refill takes less than 2 seconds.   Neurological:      Mental Status: He is alert and oriented to person, place, and time.   Psychiatric:         Mood and Affect: Mood normal.         Behavior: Behavior normal.           Imaging:  Per my read, MRI of the right shoulder and humerus from 9/26/2023 demonstrates a soft tissue mass in the anterior lateral arm that is subcutaneous.  It matches fat on all sequences.  On " the postcontrast images there is subtle enhancement, that I think is more related to incomplete fat saturation.  He also has a previous MRI that demonstrated the mass to match fat signal on all sequences as well.        Diagnosis:     1. Soft tissue mass                Assessment/Plan:     I counseled the patient regarding the above diagnosis.  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  We discussed the differences between the 2.  There are multiple benign tumors.  Atypical lipomatous do have a higher risk of recurrence.  Regarding treatment we discussed nonoperative versus surgical treatment.  For nonoperative treatment he continues to do surveillance of this mass and surgical treatment would consist of excision.  He does complain of some pain at the site of the mass and would like to have this removed.  He thinks that will also benefit him to be able to proceed with rotator cuff repair.    A full PARQ was held with the patient. Both surgical and non-operative options were discussed. They decided on surgery through shared decision making. We discussed risks include infection, blood loss, DVT/PE, damage to neurovascular structures, chronic pain, recurrence,  need for further surgery and unforeseeable events inherent to medical care. They also understand anesthesia will do a separate consent discussing risks inherent to anesthesia.They verbalized understanding and were in agreement to proceed with excision of right arm lipomatous mass.      Referrals: none  Restrictions: none  New medications/refills: none  Imaging studies: none  Follow-up: post op      Jim Mcqueen DO  Orthopedic Oncologist

## 2024-01-23 ENCOUNTER — APPOINTMENT (OUTPATIENT)
Dept: ADMISSIONS | Facility: MEDICAL CENTER | Age: 64
End: 2024-01-23
Attending: ORTHOPAEDIC SURGERY
Payer: COMMERCIAL

## 2024-01-30 ENCOUNTER — PRE-ADMISSION TESTING (OUTPATIENT)
Dept: ADMISSIONS | Facility: MEDICAL CENTER | Age: 64
End: 2024-01-30
Attending: ORTHOPAEDIC SURGERY
Payer: COMMERCIAL

## 2024-01-30 DIAGNOSIS — Z01.812 PRE-OPERATIVE LABORATORY EXAMINATION: ICD-10-CM

## 2024-01-30 RX ORDER — M-VIT,TX,IRON,MINS/CALC/FOLIC 27MG-0.4MG
1 TABLET ORAL DAILY
COMMUNITY

## 2024-01-30 RX ORDER — VITAMIN B COMPLEX
2000 TABLET ORAL DAILY
COMMUNITY

## 2024-01-30 RX ORDER — MAGNESIUM OXIDE 400 MG/1
400 TABLET ORAL DAILY
COMMUNITY

## 2024-01-30 RX ORDER — TOPIRAMATE 100 MG/1
50 TABLET, FILM COATED ORAL 2 TIMES DAILY
COMMUNITY

## 2024-02-01 ENCOUNTER — PRE-ADMISSION TESTING (OUTPATIENT)
Dept: ADMISSIONS | Facility: MEDICAL CENTER | Age: 64
End: 2024-02-01
Attending: ORTHOPAEDIC SURGERY
Payer: COMMERCIAL

## 2024-02-01 DIAGNOSIS — Z01.810 PRE-OPERATIVE CARDIOVASCULAR EXAMINATION: ICD-10-CM

## 2024-02-01 DIAGNOSIS — Z01.812 PRE-OPERATIVE LABORATORY EXAMINATION: ICD-10-CM

## 2024-02-01 LAB
ABO GROUP BLD: NORMAL
ALBUMIN SERPL BCP-MCNC: 4 G/DL (ref 3.2–4.9)
ALBUMIN/GLOB SERPL: 1.8 G/DL
ALP SERPL-CCNC: 79 U/L (ref 30–99)
ALT SERPL-CCNC: 12 U/L (ref 2–50)
ANION GAP SERPL CALC-SCNC: 10 MMOL/L (ref 7–16)
AST SERPL-CCNC: 12 U/L (ref 12–45)
BASOPHILS # BLD AUTO: 1.3 % (ref 0–1.8)
BASOPHILS # BLD: 0.04 K/UL (ref 0–0.12)
BILIRUB SERPL-MCNC: 0.4 MG/DL (ref 0.1–1.5)
BLD GP AB SCN SERPL QL: NORMAL
BUN SERPL-MCNC: 6 MG/DL (ref 8–22)
CALCIUM ALBUM COR SERPL-MCNC: 8.9 MG/DL (ref 8.5–10.5)
CALCIUM SERPL-MCNC: 8.9 MG/DL (ref 8.5–10.5)
CHLORIDE SERPL-SCNC: 110 MMOL/L (ref 96–112)
CO2 SERPL-SCNC: 24 MMOL/L (ref 20–33)
CREAT SERPL-MCNC: 0.98 MG/DL (ref 0.5–1.4)
EKG IMPRESSION: NORMAL
EOSINOPHIL # BLD AUTO: 0.06 K/UL (ref 0–0.51)
EOSINOPHIL NFR BLD: 2 % (ref 0–6.9)
ERYTHROCYTE [DISTWIDTH] IN BLOOD BY AUTOMATED COUNT: 55.5 FL (ref 35.9–50)
GFR SERPLBLD CREATININE-BSD FMLA CKD-EPI: 85 ML/MIN/1.73 M 2
GLOBULIN SER CALC-MCNC: 2.2 G/DL (ref 1.9–3.5)
GLUCOSE SERPL-MCNC: 99 MG/DL (ref 65–99)
HCT VFR BLD AUTO: 44.3 % (ref 42–52)
HGB BLD-MCNC: 14.6 G/DL (ref 14–18)
IMM GRANULOCYTES # BLD AUTO: 0.02 K/UL (ref 0–0.11)
IMM GRANULOCYTES NFR BLD AUTO: 0.7 % (ref 0–0.9)
INR PPP: 1.11 (ref 0.87–1.13)
LYMPHOCYTES # BLD AUTO: 1.34 K/UL (ref 1–4.8)
LYMPHOCYTES NFR BLD: 43.8 % (ref 22–41)
MCH RBC QN AUTO: 28.7 PG (ref 27–33)
MCHC RBC AUTO-ENTMCNC: 33 G/DL (ref 32.3–36.5)
MCV RBC AUTO: 87.2 FL (ref 81.4–97.8)
MONOCYTES # BLD AUTO: 0.31 K/UL (ref 0–0.85)
MONOCYTES NFR BLD AUTO: 10.1 % (ref 0–13.4)
NEUTROPHILS # BLD AUTO: 1.29 K/UL (ref 1.82–7.42)
NEUTROPHILS NFR BLD: 42.1 % (ref 44–72)
NRBC # BLD AUTO: 0 K/UL
NRBC BLD-RTO: 0 /100 WBC (ref 0–0.2)
PLATELET # BLD AUTO: 243 K/UL (ref 164–446)
PMV BLD AUTO: 9.5 FL (ref 9–12.9)
POTASSIUM SERPL-SCNC: 4.2 MMOL/L (ref 3.6–5.5)
PROT SERPL-MCNC: 6.2 G/DL (ref 6–8.2)
PROTHROMBIN TIME: 14.4 SEC (ref 12–14.6)
RBC # BLD AUTO: 5.08 M/UL (ref 4.7–6.1)
RH BLD: NORMAL
SODIUM SERPL-SCNC: 144 MMOL/L (ref 135–145)
WBC # BLD AUTO: 3.1 K/UL (ref 4.8–10.8)

## 2024-02-01 PROCEDURE — 93010 ELECTROCARDIOGRAM REPORT: CPT | Performed by: INTERNAL MEDICINE

## 2024-02-01 PROCEDURE — 85610 PROTHROMBIN TIME: CPT

## 2024-02-01 PROCEDURE — 86900 BLOOD TYPING SEROLOGIC ABO: CPT

## 2024-02-01 PROCEDURE — 93005 ELECTROCARDIOGRAM TRACING: CPT

## 2024-02-01 PROCEDURE — 85025 COMPLETE CBC W/AUTO DIFF WBC: CPT

## 2024-02-01 PROCEDURE — 80053 COMPREHEN METABOLIC PANEL: CPT

## 2024-02-01 PROCEDURE — 86901 BLOOD TYPING SEROLOGIC RH(D): CPT

## 2024-02-01 PROCEDURE — 86850 RBC ANTIBODY SCREEN: CPT

## 2024-02-01 PROCEDURE — 36415 COLL VENOUS BLD VENIPUNCTURE: CPT

## 2024-02-06 ENCOUNTER — ANESTHESIA EVENT (OUTPATIENT)
Dept: SURGERY | Facility: MEDICAL CENTER | Age: 64
End: 2024-02-06
Payer: COMMERCIAL

## 2024-02-06 ENCOUNTER — ANESTHESIA (OUTPATIENT)
Dept: SURGERY | Facility: MEDICAL CENTER | Age: 64
End: 2024-02-06
Payer: COMMERCIAL

## 2024-02-06 ENCOUNTER — HOSPITAL ENCOUNTER (OUTPATIENT)
Facility: MEDICAL CENTER | Age: 64
End: 2024-02-06
Attending: ORTHOPAEDIC SURGERY | Admitting: ORTHOPAEDIC SURGERY
Payer: COMMERCIAL

## 2024-02-06 VITALS
OXYGEN SATURATION: 95 % | TEMPERATURE: 97 F | HEART RATE: 83 BPM | SYSTOLIC BLOOD PRESSURE: 119 MMHG | RESPIRATION RATE: 18 BRPM | DIASTOLIC BLOOD PRESSURE: 72 MMHG

## 2024-02-06 DIAGNOSIS — M79.89 OTHER SPECIFIED SOFT TISSUE DISORDERS: ICD-10-CM

## 2024-02-06 LAB
ABO + RH BLD: NORMAL
PATHOLOGY CONSULT NOTE: NORMAL

## 2024-02-06 PROCEDURE — 700111 HCHG RX REV CODE 636 W/ 250 OVERRIDE (IP): Mod: JZ | Performed by: ANESTHESIOLOGY

## 2024-02-06 PROCEDURE — 160002 HCHG RECOVERY MINUTES (STAT): Performed by: ORTHOPAEDIC SURGERY

## 2024-02-06 PROCEDURE — 700105 HCHG RX REV CODE 258: Performed by: ORTHOPAEDIC SURGERY

## 2024-02-06 PROCEDURE — 160048 HCHG OR STATISTICAL LEVEL 1-5: Performed by: ORTHOPAEDIC SURGERY

## 2024-02-06 PROCEDURE — 700101 HCHG RX REV CODE 250: Performed by: ORTHOPAEDIC SURGERY

## 2024-02-06 PROCEDURE — 88304 TISSUE EXAM BY PATHOLOGIST: CPT

## 2024-02-06 PROCEDURE — 160038 HCHG SURGERY MINUTES - EA ADDL 1 MIN LEVEL 2: Performed by: ORTHOPAEDIC SURGERY

## 2024-02-06 PROCEDURE — 160046 HCHG PACU - 1ST 60 MINS PHASE II: Performed by: ORTHOPAEDIC SURGERY

## 2024-02-06 PROCEDURE — 160035 HCHG PACU - 1ST 60 MINS PHASE I: Performed by: ORTHOPAEDIC SURGERY

## 2024-02-06 PROCEDURE — 160025 RECOVERY II MINUTES (STATS): Performed by: ORTHOPAEDIC SURGERY

## 2024-02-06 PROCEDURE — 36415 COLL VENOUS BLD VENIPUNCTURE: CPT

## 2024-02-06 PROCEDURE — 160009 HCHG ANES TIME/MIN: Performed by: ORTHOPAEDIC SURGERY

## 2024-02-06 PROCEDURE — 160027 HCHG SURGERY MINUTES - 1ST 30 MINS LEVEL 2: Performed by: ORTHOPAEDIC SURGERY

## 2024-02-06 PROCEDURE — 24075 EXC ARM/ELBOW LES SC < 3 CM: CPT | Mod: RT | Performed by: ORTHOPAEDIC SURGERY

## 2024-02-06 RX ORDER — BUPIVACAINE HYDROCHLORIDE AND EPINEPHRINE 5; 5 MG/ML; UG/ML
INJECTION, SOLUTION EPIDURAL; INTRACAUDAL; PERINEURAL
Status: DISCONTINUED | OUTPATIENT
Start: 2024-02-06 | End: 2024-02-06 | Stop reason: HOSPADM

## 2024-02-06 RX ORDER — ONDANSETRON 2 MG/ML
4 INJECTION INTRAMUSCULAR; INTRAVENOUS
Status: DISCONTINUED | OUTPATIENT
Start: 2024-02-06 | End: 2024-02-06 | Stop reason: HOSPADM

## 2024-02-06 RX ORDER — MIDAZOLAM HYDROCHLORIDE 1 MG/ML
INJECTION INTRAMUSCULAR; INTRAVENOUS PRN
Status: DISCONTINUED | OUTPATIENT
Start: 2024-02-06 | End: 2024-02-06 | Stop reason: SURG

## 2024-02-06 RX ORDER — HALOPERIDOL 5 MG/ML
1 INJECTION INTRAMUSCULAR
Status: DISCONTINUED | OUTPATIENT
Start: 2024-02-06 | End: 2024-02-06 | Stop reason: HOSPADM

## 2024-02-06 RX ORDER — DEXAMETHASONE SODIUM PHOSPHATE 4 MG/ML
INJECTION, SOLUTION INTRA-ARTICULAR; INTRALESIONAL; INTRAMUSCULAR; INTRAVENOUS; SOFT TISSUE PRN
Status: DISCONTINUED | OUTPATIENT
Start: 2024-02-06 | End: 2024-02-06 | Stop reason: SURG

## 2024-02-06 RX ORDER — ONDANSETRON 2 MG/ML
INJECTION INTRAMUSCULAR; INTRAVENOUS PRN
Status: DISCONTINUED | OUTPATIENT
Start: 2024-02-06 | End: 2024-02-06 | Stop reason: SURG

## 2024-02-06 RX ORDER — HYDROMORPHONE HYDROCHLORIDE 1 MG/ML
0.4 INJECTION, SOLUTION INTRAMUSCULAR; INTRAVENOUS; SUBCUTANEOUS
Status: DISCONTINUED | OUTPATIENT
Start: 2024-02-06 | End: 2024-02-06 | Stop reason: HOSPADM

## 2024-02-06 RX ORDER — ACETAMINOPHEN 500 MG
1000 TABLET ORAL EVERY 6 HOURS PRN
Status: DISCONTINUED | OUTPATIENT
Start: 2024-02-06 | End: 2024-02-06 | Stop reason: HOSPADM

## 2024-02-06 RX ORDER — HYDROMORPHONE HYDROCHLORIDE 1 MG/ML
0.1 INJECTION, SOLUTION INTRAMUSCULAR; INTRAVENOUS; SUBCUTANEOUS
Status: DISCONTINUED | OUTPATIENT
Start: 2024-02-06 | End: 2024-02-06 | Stop reason: HOSPADM

## 2024-02-06 RX ORDER — SODIUM CHLORIDE, SODIUM LACTATE, POTASSIUM CHLORIDE, CALCIUM CHLORIDE 600; 310; 30; 20 MG/100ML; MG/100ML; MG/100ML; MG/100ML
INJECTION, SOLUTION INTRAVENOUS CONTINUOUS
Status: DISCONTINUED | OUTPATIENT
Start: 2024-02-06 | End: 2024-02-06 | Stop reason: HOSPADM

## 2024-02-06 RX ORDER — MEPERIDINE HYDROCHLORIDE 25 MG/ML
12.5 INJECTION INTRAMUSCULAR; INTRAVENOUS; SUBCUTANEOUS
Status: DISCONTINUED | OUTPATIENT
Start: 2024-02-06 | End: 2024-02-06 | Stop reason: HOSPADM

## 2024-02-06 RX ORDER — LABETALOL HYDROCHLORIDE 5 MG/ML
5 INJECTION, SOLUTION INTRAVENOUS
Status: DISCONTINUED | OUTPATIENT
Start: 2024-02-06 | End: 2024-02-06 | Stop reason: HOSPADM

## 2024-02-06 RX ORDER — METOPROLOL TARTRATE 1 MG/ML
1 INJECTION, SOLUTION INTRAVENOUS
Status: DISCONTINUED | OUTPATIENT
Start: 2024-02-06 | End: 2024-02-06 | Stop reason: HOSPADM

## 2024-02-06 RX ORDER — KETOROLAC TROMETHAMINE 15 MG/ML
INJECTION, SOLUTION INTRAMUSCULAR; INTRAVENOUS PRN
Status: DISCONTINUED | OUTPATIENT
Start: 2024-02-06 | End: 2024-02-06 | Stop reason: SURG

## 2024-02-06 RX ORDER — HYDROMORPHONE HYDROCHLORIDE 1 MG/ML
0.2 INJECTION, SOLUTION INTRAMUSCULAR; INTRAVENOUS; SUBCUTANEOUS
Status: DISCONTINUED | OUTPATIENT
Start: 2024-02-06 | End: 2024-02-06 | Stop reason: HOSPADM

## 2024-02-06 RX ORDER — EPHEDRINE SULFATE 50 MG/ML
5 INJECTION, SOLUTION INTRAVENOUS
Status: DISCONTINUED | OUTPATIENT
Start: 2024-02-06 | End: 2024-02-06 | Stop reason: HOSPADM

## 2024-02-06 RX ORDER — MIDAZOLAM HYDROCHLORIDE 1 MG/ML
1 INJECTION INTRAMUSCULAR; INTRAVENOUS
Status: DISCONTINUED | OUTPATIENT
Start: 2024-02-06 | End: 2024-02-06 | Stop reason: HOSPADM

## 2024-02-06 RX ORDER — PHENYLEPHRINE HCL IN 0.9% NACL 0.5 MG/5ML
SYRINGE (ML) INTRAVENOUS PRN
Status: DISCONTINUED | OUTPATIENT
Start: 2024-02-06 | End: 2024-02-06 | Stop reason: SURG

## 2024-02-06 RX ORDER — DIPHENHYDRAMINE HYDROCHLORIDE 50 MG/ML
12.5 INJECTION INTRAMUSCULAR; INTRAVENOUS
Status: DISCONTINUED | OUTPATIENT
Start: 2024-02-06 | End: 2024-02-06 | Stop reason: HOSPADM

## 2024-02-06 RX ORDER — CEFAZOLIN SODIUM 1 G/3ML
INJECTION, POWDER, FOR SOLUTION INTRAMUSCULAR; INTRAVENOUS PRN
Status: DISCONTINUED | OUTPATIENT
Start: 2024-02-06 | End: 2024-02-06 | Stop reason: SURG

## 2024-02-06 RX ADMIN — CEFAZOLIN 2 G: 1 INJECTION, POWDER, FOR SOLUTION INTRAMUSCULAR; INTRAVENOUS at 12:40

## 2024-02-06 RX ADMIN — Medication 200 MCG: at 12:34

## 2024-02-06 RX ADMIN — SODIUM CHLORIDE, POTASSIUM CHLORIDE, SODIUM LACTATE AND CALCIUM CHLORIDE: 600; 310; 30; 20 INJECTION, SOLUTION INTRAVENOUS at 11:23

## 2024-02-06 RX ADMIN — ONDANSETRON 4 MG: 2 INJECTION INTRAMUSCULAR; INTRAVENOUS at 12:47

## 2024-02-06 RX ADMIN — Medication 200 MCG: at 12:39

## 2024-02-06 RX ADMIN — KETOROLAC TROMETHAMINE 15 MG: 15 INJECTION, SOLUTION INTRAMUSCULAR; INTRAVENOUS at 13:03

## 2024-02-06 RX ADMIN — FENTANYL CITRATE 250 MCG: 50 INJECTION, SOLUTION INTRAMUSCULAR; INTRAVENOUS at 12:27

## 2024-02-06 RX ADMIN — PROPOFOL 80 MG: 10 INJECTION, EMULSION INTRAVENOUS at 12:27

## 2024-02-06 RX ADMIN — DEXAMETHASONE SODIUM PHOSPHATE 4 MG: 4 INJECTION INTRA-ARTICULAR; INTRALESIONAL; INTRAMUSCULAR; INTRAVENOUS; SOFT TISSUE at 12:44

## 2024-02-06 RX ADMIN — MIDAZOLAM HYDROCHLORIDE 2 MG: 1 INJECTION, SOLUTION INTRAMUSCULAR; INTRAVENOUS at 12:24

## 2024-02-06 ASSESSMENT — PAIN SCALES - GENERAL: PAIN_LEVEL: 0

## 2024-02-06 ASSESSMENT — PAIN DESCRIPTION - PAIN TYPE: TYPE: SURGICAL PAIN

## 2024-02-06 NOTE — ANESTHESIA PROCEDURE NOTES
Airway    Date/Time: 2/6/2024 12:30 PM    Performed by: Artur Sequeira M.D.  Authorized by: Artur Sequeira M.D.    Location:  OR  Urgency:  Elective  Indications for Airway Management:  Anesthesia      Spontaneous Ventilation: absent    Sedation Level:  Deep  Preoxygenated: Yes    Mask Difficulty Assessment:  0 - not attempted  Final Airway Type:  Supraglottic airway  Final Supraglottic Airway:  Standard LMA    SGA Size:  4  Number of Attempts at Approach:  1

## 2024-02-06 NOTE — OR NURSING
1418 Report received from PACU Rn and patient arrived to phase 2. AO x 4.    1430  Vital signs stable. Pain level 0/10, no complaints of n/v, sipping coffee  Dressing to RUE c/d/I, hand warm, pink, wiggle,sensation intact..      1455  Patient states ready to go home.  VSS, patient has all belongings. IV removed.  Patient dressed.  Discharge instructions discussed with patient nad ex wife.  Questions answered. Patient and x wife verbalized understanding.  Patient taken out walking and stead and with all belongings.

## 2024-02-06 NOTE — ANESTHESIA POSTPROCEDURE EVALUATION
Patient: Zoltan Shin    Procedure Summary       Date: 02/06/24 Room / Location: [facility] 06 / SURGERY [facility]    Anesthesia Start: 1223 Anesthesia Stop: 1317    Procedure: EXCISION OF RIGHT ARM MASS (Arm Upper) Diagnosis: (RIGHT ARM LIPOMATOUS MASS)    Surgeons: Jim Mcqueen D.O. Responsible Provider: Artur Sequeira M.D.    Anesthesia Type: general ASA Status: 2            Final Anesthesia Type: general  Last vitals  BP   Blood Pressure : 119/65    Temp   36.9 °C (98.5 °F)    Pulse   74   Resp   16    SpO2   99 %      Anesthesia Post Evaluation    Patient location during evaluation: PACU  Patient participation: complete - patient participated  Level of consciousness: awake and alert  Pain score: 0    Airway patency: patent  Anesthetic complications: no  Cardiovascular status: hemodynamically stable  Respiratory status: acceptable  Hydration status: euvolemic    PONV: none          No notable events documented.     Nurse Pain Score: 0 (NPRS)

## 2024-02-06 NOTE — ANESTHESIA PREPROCEDURE EVALUATION
Case: [id number] Date/Time: 02/06/24 1245    Procedure: EXCISION OF RIGHT ARM MASS (Arm Upper)    Anesthesia type: General    Pre-op diagnosis: SOFT TISSUE MASS    Location: [facility] OR 06 / SURGERY [facility]    Surgeons: Jim Mcqueen D.O.            Relevant Problems   No relevant active problems       Physical Exam    Airway   Mallampati: II  TM distance: >3 FB  Neck ROM: full       Cardiovascular - normal exam  Rhythm: regular  Rate: normal  (-) murmur     Dental - normal exam           Pulmonary - normal exam  Breath sounds clear to auscultation     Abdominal    Neurological - normal exam                   Anesthesia Plan    ASA 2       Plan - general       Airway plan will be LMA                    Informed Consent:

## 2024-02-06 NOTE — ANESTHESIA TIME REPORT
Anesthesia Start and Stop Event Times       Date Time Event    2/6/2024 1141 Ready for Procedure     1223 Anesthesia Start     1317 Anesthesia Stop          Responsible Staff  02/06/24      Name Role Begin End    Artur Sequeira M.D. Anesth 1223 1317          Overtime Reason:  no overtime (within assigned shift)    Comments:

## 2024-02-06 NOTE — DISCHARGE INSTR - OTHER INFO
Discharge Instructions:    Diet: Resume your regular diet. Try to eat protein with every meal, this is important for healing and recovery. Drink plenty of water to stay hydrated and avoid constipation after surgery.    Restrictions: No lifting >10 pounds until sutures come out and you are cleared by your surgeon.    Pain control: You may take tylenol 1000 mg three times daily for baseline pain control or for mild pain. Do not take more then two doses daily if you have any liver disease or impairment. You may continue taking your muscle relaxer and Hydrocodone as prescribed. Call if your pain is uncontrolled.     Blood Clot Prevention: Make sure you are taking short walks around the house three times daily.    Wound care: Keep wound clean and dry. Leave your dressing on for three days and do not remove. You may shower over the wound and pat dry starting post operative day four. Replace dressings to keep wound covered unless you are in a clean environment then the wound can remain open to air. Do not let pets sit on or lick your wound. Call if you develop redness spreading from the wound, puss draining from the wound or new drainage from the wound after it has been dry.     Call if you develop fevers, chills, wound concerns or have any questions.      Future Appointments         Provider Department Center    2/22/2024 1:30 PM Jim Mcqueen D.O. [facility] - Surgical Oncology

## 2024-02-06 NOTE — DISCHARGE INSTRUCTIONS
HOME CARE INSTRUCTIONS    ACTIVITY: Rest and take it easy for the first 24 hours.  A responsible adult is recommended to remain with you during that time.  It is normal to feel sleepy.  We encourage you to not do anything that requires balance, judgment or coordination.    FOR 24 HOURS DO NOT:  Drive, operate machinery or run household appliances.  Drink beer or alcoholic beverages.  Make important decisions or sign legal documents.    SPECIAL INSTRUCTIONS:   Weight bearing restrictions: no lifting >10 pounds.  blood clot prevention to be started post operative day 1 (2/7) with resumption of daily aspirin and daily walks. Advance to a regular diet, follow up in 2 weeks for wound check, pathology review and suture removal.      DIET: To avoid nausea, slowly advance diet as tolerated, avoiding spicy or greasy foods for the first day.  Add more substantial food to your diet according to your physician's instructions.  Babies can be fed formula or breast milk as soon as they are hungry.  INCREASE FLUIDS AND FIBER TO AVOID CONSTIPATION.    SURGICAL DRESSING/BATHING: _Keep dressing clean and dry until follow up.__    MEDICATIONS: Resume taking daily medication.  Take prescribed pain medication with food.  If no medication is prescribed, you may take non-aspirin pain medication if needed.  PAIN MEDICATION CAN BE VERY CONSTIPATING.  Take a stool softener or laxative such as senokot, pericolace, or milk of magnesia if needed.    A follow-up appointment should be arranged with your doctor in 1-2 weeks; call to schedule.    You should CALL YOUR PHYSICIAN if you develop:  Fever greater than 101 degrees F.  Pain not relieved by medication, or persistent nausea or vomiting.  Excessive bleeding (blood soaking through dressing) or unexpected drainage from the wound.  Extreme redness or swelling around the incision site, drainage of pus or foul smelling drainage.  Inability to urinate or empty your bladder within 8 hours.  Problems  with breathing or chest pain.    You should call 911 if you develop problems with breathing or chest pain.  If you are unable to contact your doctor or surgical center, you should go to the nearest emergency room or urgent care center.  Physician's telephone #: [phone]    MILD FLU-LIKE SYMPTOMS ARE NORMAL.  YOU MAY EXPERIENCE GENERALIZED MUSCLE ACHES, THROAT IRRITATION, HEADACHE AND/OR SOME NAUSEA.    If any questions arise, call your doctor.  If your doctor is not available, please feel free to call the Surgical Center at [phone].  The Center is open Monday through Friday from 7AM to 7PM.      A registered nurse may call you a few days after your surgery to see how you are doing after your procedure.    You may also receive a survey in the mail within the next two weeks and we ask that you take a few moments to complete the survey and return it to us.  Our goal is to provide you with very good care and we value your comments.     Depression / Suicide Risk    As you are discharged from this [facility] facility, it is important to learn how to keep safe from harming yourself.    Recognize the warning signs:  Abrupt changes in personality, positive or negative- including increase in energy   Giving away possessions  Change in eating patterns- significant weight changes-  positive or negative  Change in sleeping patterns- unable to sleep or sleeping all the time   Unwillingness or inability to communicate  Depression  Unusual sadness, discouragement and loneliness  Talk of wanting to die  Neglect of personal appearance   Rebelliousness- reckless behavior  Withdrawal from people/activities they love  Confusion- inability to concentrate     If you or a loved one observes any of these behaviors or has concerns about self-harm, here's what you can do:  Talk about it- your feelings and reasons for harming yourself  Remove any means that you might use to hurt yourself (examples: pills, rope, extension cords,  firearm)  Get professional help from the community (Mental Health, Substance Abuse, psychological counseling)  Do not be alone:Call your Safe Contact- someone whom you trust who will be there for you.  Call your local CRISIS HOTLINE 056-4512 or 290-269-2735  Call your local Children's Mobile Crisis Response Team [place] (275) 483-0847 or www.[place]  Call the toll free National Suicide Prevention Hotlines   National Suicide Prevention Lifeline 450-675-FUWC (0239)  [facility] Line Network 800-SUICIDE (830-3058)    I acknowledge receipt and understanding of these Home Care instructions.

## 2024-02-06 NOTE — OP REPORT
Date of Operation: 2/6/2024    Preoperative Diagnosis: Right arm lipomatous mass     Postoperative Diagnosis: Same    Indications: This is a 63 year old male that has had a soft tissue mass of the right arm for at least 3 years. He has had two MRIs that demonstrate the mass to match fat signal on all sequences consistent with a benign lipomatous mass. After discussion of treatment options he would like it removed. A full PARQ was held preoperatively and he wished to proceed.     Operative Procedure:  Excision right arm lipomatous mass, 7 cm    Surgeon: Jim Mcqueen DO    Assistant:  BAKARI Glass  A surgical assistant in this surgery was indicated due to the complexity of the procedure.  Their role included aiding in the positioning, approach, retraction, holding of devices, manipulation of the limb and closure of wounds.    Anesthesia: general      Estimated Blood Loss:  50 cc    Specimens: right arm lipomatous mass    Drains: none     Implants: none    DESCRIPTION OF PROCEDURE:     The patient was transferred to the operating table and placed in the supine position. Pressure points were padded and the patient was secured to the table. Sequential compression garments were applied to the lower extremities. General anesthesia was induced and the patient was intubated by the anesthesia team.  Ancef was given as prophylaxis. The right upper extremity was prepped and draped in standard fashion. A time out was performed.     A longitudinal incision was made over the mass. Skin flaps were developed around the mass and electrocautery was used for hemostasis. The mass was intimately attached to the subcutaneous fat. Circumferential dissection was carried out around the mass. Several small perforating vessels to the mass were cauterized. The mass was completely removed and sent to pathology as a routine specimen. The wound was irrigated and hemostasis achieved.     The wound was closed in layers. Sterile  dressings were applied.     The patient was then awakened, extubated and transferred to the post-anesthesia care unit in stable condition.     Disposition: After recovery in the post anesthesia care unit the patient will discharge home. Weight bearing restrictions: no lifting >10 pounds.  DVT prophylaxis will be initiated post operative day 1 with resumption of his daily aspirin and instructed to walk daily. They will advance to a regular diet. They will follow up in 2 weeks for wound check, pathology review and suture removal.     Jim Mcqueen DO  Orthopedic Oncologist

## 2024-02-06 NOTE — OR NURSING
Assume care for patient in pre-op. Allergies and NPO status verified. Consents for surgical procedure signed and on chart. PIV started.  Call light within reach. Bed at lowest position.

## 2024-02-06 NOTE — OR NURSING
1315: Pt arrived from OR, handoff received from anesthesiologist and RN. Patient asleep, breathing even and unlabored. Vitals stable. Dressing to right upper arm C/D/I.     1330: Patient sitting up, oriented x4, BENÍTEZ. Denies pain and nausea.     1340: Doctor Richar at bedside discussing procedure with patient.     1350: Ex wife updated on status.     1400: Patient continues to deny presence of pain. tolerating oral intake of fluids.     1408: Report given to brayden Tobias RN, Yajaira.

## 2024-02-22 ENCOUNTER — OFFICE VISIT (OUTPATIENT)
Dept: SURGICAL ONCOLOGY | Facility: MEDICAL CENTER | Age: 64
End: 2024-02-22
Payer: COMMERCIAL

## 2024-02-22 VITALS
OXYGEN SATURATION: 98 % | WEIGHT: 259 LBS | HEART RATE: 111 BPM | TEMPERATURE: 97.9 F | SYSTOLIC BLOOD PRESSURE: 112 MMHG | DIASTOLIC BLOOD PRESSURE: 68 MMHG | BODY MASS INDEX: 37.16 KG/M2

## 2024-02-22 DIAGNOSIS — M79.89 SOFT TISSUE MASS: ICD-10-CM

## 2024-02-22 DIAGNOSIS — M96.842 POSTOPERATIVE SEROMA OF MUSCULOSKELETAL STRUCTURE AFTER MUSCULOSKELETAL PROCEDURE: ICD-10-CM

## 2024-02-22 PROCEDURE — 99024 POSTOP FOLLOW-UP VISIT: CPT | Performed by: ORTHOPAEDIC SURGERY

## 2024-02-22 PROCEDURE — 3078F DIAST BP <80 MM HG: CPT | Performed by: ORTHOPAEDIC SURGERY

## 2024-02-22 PROCEDURE — 3074F SYST BP LT 130 MM HG: CPT | Performed by: ORTHOPAEDIC SURGERY

## 2024-02-22 ASSESSMENT — FIBROSIS 4 INDEX: FIB4 SCORE: 0.93

## 2024-02-23 ASSESSMENT — ENCOUNTER SYMPTOMS
FEVER: 0
CHILLS: 0

## 2024-02-24 NOTE — PROGRESS NOTES
Diagnosis:  Right arm lipoma    Surgical Interventions: Excision right arm lipoma 2/6/24    Pathology:  FINAL DIAGNOSIS:     A. Right shoulder mass:          Benign lipoma.       HPI: Patient is a pleasant 63-year-old male here for postoperative visit status post the above.  He reports no pain to the arm currently.  He denies any numbness or paresthesias.  He denies fevers and chills.  He does note there has been some fluid accumulation at the surgical site.    Past Medical History:   Past Medical History:   Diagnosis Date    Abdominal pain     Adverse effect of anesthesia     unable to urinate post-op    Anesthesia     difficulty waking up, over sedation    Apnea, sleep     Arthritis     knees, right foot, right ankle    Back pain     Back pain     Bowel habit changes     constipation-REGULAR SINCE SURGERY DEC 2023    Chest tightness     Daytime sleepiness     Earache     Environmental and seasonal allergies     Fatigue     Frequent headaches     Gasping for breath     Heart burn     Hypertension     Insomnia     Morning headache     Myocardial infarct (HCC) 12/23    Obesity     Pain 08/19/2016    bilat knees, right ankle & foot, hips, 8/10    Painful joint     Psychiatric problem     anxiety/depression    Shortness of breath     Sleep apnea     BIPAP- not using now    Snoring     Sore muscles     Wears glasses        Past Surgical History:  Past Surgical History:   Procedure Laterality Date    EXCISION, MASS, UPPER EXTREMITY  2/6/2024    Procedure: EXCISION OF RIGHT ARM MASS;  Surgeon: Jim Mcqueen D.O.;  Location: SURGERY [facility];  Service: Orthopedics    GA SIGMOIDOSCOPY,DIAGNOSTIC N/A 03/03/2023    Procedure: COLONOSCOPY, BIOPSY OF RECTAL STRICTURE;  Surgeon: Louis Stack M.D.;  Location: [facility];  Service: General    ANKLE TOTAL ARTHROPLASTY Right 02/14/2019    Procedure: ANKLE TOTAL ARTHROPLASTY ; GASTROC SOLEUS RECESSION;  Surgeon: Sonido Lujan M.D.;  Location: SURGERY  [facility];  Service: Orthopedics    ANKLE ARTHROSCOPY Right 08/23/2016    Procedure: ANKLE ARTHROSCOPY;  Surgeon: Sonido Lujan M.D.;  Location: SURGERY [facility];  Service:     HARDWARE REMOVAL ORTHO Right 08/23/2016    Procedure: HARDWARE REMOVAL ORTHO cannulated screws;  Surgeon: Sonido Lujan M.D.;  Location: SURGERY [facility];  Service:     LIGAMENT REPAIR Right 08/23/2016    Procedure: LIGAMENT REPAIR Lateral Stabilization,  and Peroneal Tenosynovectomy with repair;  Surgeon: Sonido Lujan M.D.;  Location: SURGERY [facility];  Service:     GASTRIC BYPASS LAPAROSCOPIC  2011    KNEE REPLACEMENT, TOTAL Left 2011    ANKLE ARTHROSCOPY Right 2012/2014 x2/2015    ARTHROSCOPY, KNEE      COLON RESECTION      GASTROSTOMY      NO PERTINENT PAST SURGICAL HISTORY  1/12    Ankle    OTHER  1/10    Sleeve knee, foot ankle    SLEEVE,KEVIN VASO THIGH         Outpatient Encounter Medications as of 2/22/2024   Medication Sig Dispense Refill    therapeutic multivitamin-minerals (THERAGRAN-M) Tab Take 1 Tablet by mouth every day.      vitamin D3 (CHOLECALCIFEROL) 1000 Unit (25 mcg) Tab Take 2,000 Units by mouth every day.      magnesium oxide (MAG-OX) 400 MG Tab tablet Take 400 mg by mouth every day.      topiramate (TOPAMAX) 100 MG Tab Take 50 mg by mouth 2 times a day. 0.5 tablet (twice daily)      tamsulosin (FLOMAX) 0.4 MG capsule Take 0.4 mg by mouth 1/2 hour after breakfast.      albuterol 108 (90 Base) MCG/ACT Aero Soln inhalation aerosol Inhale 2 Puffs every 6 hours as needed for Shortness of Breath.      furosemide (LASIX) 40 MG Tab Take 60 mg by mouth every day. 1.5 tablets (60 mg)      potassium chloride SA (KDUR) 20 MEQ Tab CR Take 2 Tablets by mouth every day. Dissolved in 4 ounces of water      pregabalin (LYRICA) 100 MG Cap Take 1 Capsule by mouth 3 times a day.      tadalafil (CIALIS) 5 MG tablet Take 4 Tablets by mouth as needed for Erectile Dysfunction.      testosterone cypionate  (DEPO-TESTOSTERONE) 200 MG/ML Solution injection Inject 1 mL into the shoulder, thigh, or buttocks see administration instructions. Every 10 days      traZODone (DESYREL) 50 MG Tab Take 1 Tablet by mouth every evening.      B Complex Vitamins (VITAMIN-B COMPLEX PO) Take 1 Cap by mouth 2 times a day.      ascorbic acid (VITAMIN C) 250 MG tablet Take 1 Tablet by mouth every day.      anastrozole (ARIMIDEX) 1 MG Tab Take 1 Tablet by mouth every Monday, Wednesday, and Friday.      aspirin EC (ECOTRIN) 81 MG Tablet Delayed Response Take 1 Tablet by mouth 2 times a day.      Cyanocobalamin (VITAMIN B-12) 1000 MCG Tab Take 1 Tablet by mouth every day.      fluticasone (FLONASE) 50 MCG/ACT nasal spray Administer 1 Spray into affected nostril(S) every day. Indications: Hayfever      Loratadine 10 MG Cap Take 1 Cap by mouth every day.      cyclobenzaprine (FLEXERIL) 10 MG Tab Take 10 mg by mouth 3 times a day as needed. Indications: Muscle Spasm       No facility-administered encounter medications on file as of 2/22/2024.        Allergies:   Allergies   Allergen Reactions    Oxycodone Vomiting     Violent Nausea/Vomiting    Seasonal Runny Nose     Runny nose ,congestion       Family History:   Family History   Problem Relation Age of Onset    Hypertension Father        Social History:   Social History     Tobacco Use   Smoking Status Never   Smokeless Tobacco Current    Types: Chew   Tobacco Comments    chewed for a bit quit 2015 -STARTED MAY 7 2023     Social History     Substance and Sexual Activity   Alcohol Use No     Social History     Substance and Sexual Activity   Drug Use Never     Social History     Socioeconomic History    Marital status:      Spouse name: Not on file    Number of children: Not on file    Years of education: Not on file    Highest education level: Not on file   Occupational History    Not on file   Tobacco Use    Smoking status: Never    Smokeless tobacco: Current     Types: Chew    Tobacco  comments:     chewed for a bit quit 2015 -STARTED MAY 7 2023   Vaping Use    Vaping Use: Never used   Substance and Sexual Activity    Alcohol use: No    Drug use: Never    Sexual activity: Not on file   Other Topics Concern    Not on file   Social History Narrative    Not on file     Social Determinants of Health     Financial Resource Strain: Not on file   Food Insecurity: Not on file   Transportation Needs: Not on file   Physical Activity: Not on file   Stress: Not on file   Social Connections: Not on file   Intimate Partner Violence: Not on file   Housing Stability: Not on file       Review of Systems:  Review of Systems   Constitutional:  Negative for chills and fever.   Skin:  Negative for rash.       Physical Exam:  /68 (BP Location: Right arm, Patient Position: Sitting)   Pulse (!) 111   Temp 36.6 °C (97.9 °F) (Temporal)   Wt 117 kg (259 lb)   SpO2 98%   BMI 37.16 kg/m²     Physical Exam  Constitutional:       General: He is not in acute distress.     Appearance: Normal appearance. He is not ill-appearing.   HENT:      Head: Normocephalic and atraumatic.   Eyes:      Extraocular Movements: Extraocular movements intact.      Conjunctiva/sclera: Conjunctivae normal.   Pulmonary:      Effort: Pulmonary effort is normal. No respiratory distress.   Musculoskeletal:      Cervical back: Normal range of motion and neck supple.      Comments: RUE: Wound is without surrounding erythema, drainage or dehiscence.  Sutures were removed today.  There is a seroma at the surgical site.  He is nontender to palpation of the arm.  Sensation intact to light touch radial, median and ulnar nerves.  Motor intact AIN, PIN, median and ulnar nerves.  Radial pulse 2+   Skin:     General: Skin is warm and dry.   Neurological:      Mental Status: He is alert.           Assessment and Plan:  1.  Right arm lipoma  I counseled the patient regarding the pathology results.  We discussed the risk for recurrence is quite low.  He is  cleared to have shoulder surgery for his rotator cuff and I have communicated this to his shoulder surgeon.  We did discuss aspirating the seroma today and he would like to proceed with this.  See separate procedure note.  He does not need continued follow-up unless questions or concerns arise.  He will return for follow-up as needed.        Jim Mcqueen, DO  Orthopedics and Orthopedic Oncology

## 2024-02-24 NOTE — PROCEDURES
Procedure: Aspiration of postoperative seroma right arm    Diagnosis: Postoperative seroma right arm    Consent: Verbal consent obtained from patient. We discussed risks of infection, blood loss, damage to nerves/vessels in the area, pain and non-diagnostic biopsy.     Performed by: Jmi Mcqueen DO    Procedure details:  A time out was held to confirm the site and patient to be biopsied. The site was prepped with alcohol wipes.  Next a syringe on a 22-gauge needle was inserted into the seroma.  30 mL of serosanguineous fluid was aspirated.  The patient tolerated the procedure well.  A dressing was placed using gauze and an Ace wrap.  The patient was instructed to maintain the dressing for 24 hours unless it feels too tight then the wrap can be loosened or removed. They verbalized understanding.    Jim Mcqueen DO  Orthopedic Oncology  [facility]

## 2024-08-19 ENCOUNTER — HOSPITAL ENCOUNTER (OUTPATIENT)
Dept: RADIOLOGY | Facility: MEDICAL CENTER | Age: 64
End: 2024-08-19
Attending: FAMILY MEDICINE
Payer: COMMERCIAL

## (undated) DEVICE — SET LEADWIRE 5 LEAD BEDSIDE DISPOSABLE ECG (1SET OF 5/EA)

## (undated) DEVICE — PAD LAP STERILE 18 X 18 - (5/PK 40PK/CA)

## (undated) DEVICE — PIN FIXATION

## (undated) DEVICE — TUBING CLEARLINK DUO-VENT - C-FLO (48EA/CA)

## (undated) DEVICE — TUBE CONNECTING SUCTION - CLEAR PLASTIC STERILE 72 IN (50EA/CA)

## (undated) DEVICE — LACTATED RINGERS INJ 1000 ML - (14EA/CA 60CA/PF)

## (undated) DEVICE — DRAPE LARGE 3 QUARTER - (20/CA)

## (undated) DEVICE — SENSOR OXIMETER ADULT SPO2 RD SET (20EA/BX)

## (undated) DEVICE — FORCEP RADIAL JAW 4 STANDARD CAPACITY W/NEEDLE 240CM (40EA/BX)

## (undated) DEVICE — SYRINGE SAFETY 3 ML 18 GA X 1 1/2 BLUNT LL (100/BX 8BX/CA)

## (undated) DEVICE — DRAPE STRLE REG TOWEL 18X24 - (10/BX 4BX/CA)"

## (undated) DEVICE — DRESSING XEROFORM 1X8 - (50/BX 4BX/CA)

## (undated) DEVICE — GLOVE BIOGEL PI INDICATOR SZ 7.0 SURGICAL PF LF - (50/BX 4BX/CA)

## (undated) DEVICE — DRAPE U ORTHOPEDIC - (10/BX)

## (undated) DEVICE — KIT ROOM DECONTAMINATION

## (undated) DEVICE — GOWN WARMING STANDARD FLEX - (30/CA)

## (undated) DEVICE — PACK LOWER EXTREMITY - (2/CA)

## (undated) DEVICE — GOWN SURGEONS LARGE - (32/CA)

## (undated) DEVICE — BLADE SURGICAL #15 - (50/BX 3BX/CA)

## (undated) DEVICE — CHLORAPREP 26 ML APPLICATOR - ORANGE TINT(25/CA)

## (undated) DEVICE — HEAD HOLDER JUNIOR/ADULT

## (undated) DEVICE — TOWELS CLOTH SURGICAL - (4/PK 20PK/CA)

## (undated) DEVICE — PADDING CAST 6 IN STERILE - 6 X 4 YDS (24/CA)

## (undated) DEVICE — SUTURE GENERAL

## (undated) DEVICE — SPONGE GAUZESTER 4 X 4 4PLY - (128PK/CA)

## (undated) DEVICE — DRESSING 3X8 ADAPTIC GAUZE - NON-ADHERING (36/PK 6PK/BX)

## (undated) DEVICE — NEPTUNE 4 PORT MANIFOLD - (20/PK)

## (undated) DEVICE — SODIUM CHL IRRIGATION 0.9% 1000ML (12EA/CA)

## (undated) DEVICE — Device

## (undated) DEVICE — DERMABOND ADVANCED - (12EA/BX)

## (undated) DEVICE — DRAPE C ARMOR (12EA/CA)

## (undated) DEVICE — SUTURE2-0 27IN VCRL ANTI VIOL (36PK/BX)

## (undated) DEVICE — SUTURE 3-0 MONOCRYL PLUS PS-1 - 27 INCH (36/BX)

## (undated) DEVICE — GLOVE, LITE (PAIR)

## (undated) DEVICE — PROTECTOR ULNA NERVE - (36PR/CA)

## (undated) DEVICE — SLEEVE, VASO, THIGH, MED

## (undated) DEVICE — TOWEL STOP TIMEOUT SAFETY FLAG (40EA/CA)

## (undated) DEVICE — GLOVE BIOGEL PI INDICATOR SZ 8.0 SURGICAL PF LF -(50/BX 4BX/CA)

## (undated) DEVICE — TOURNIQUET CUFF 34 X 4 ONE PORT DISP - STERILE (10/BX)

## (undated) DEVICE — BLADE SAW RECIPROCATING 8.0 X 0.98 X 43MM

## (undated) DEVICE — SUTURE 3-0 ETHILON PS-1 (36PK/BX)

## (undated) DEVICE — TUBE E-T HI-LO CUFF 6.5MM (10EA/BX)

## (undated) DEVICE — PADDING CAST 4 IN STERILE - 4 X 4 YDS (24/CA)

## (undated) DEVICE — SUCTION INSTRUMENT YANKAUER BULBOUS TIP W/O VENT (50EA/CA)

## (undated) DEVICE — GLOVE BIOGEL SZ 8 SURGICAL PF LTX - (50PR/BX 4BX/CA)

## (undated) DEVICE — GLOVE SZ 7.5 BIOGEL PI MICRO - PF LF (50PR/BX)

## (undated) DEVICE — SYRINGE SAFETY 10 ML 18 GA X 1 1/2 BLUNT LL (100/BX 4BX/CA)

## (undated) DEVICE — ELECTRODE 850 FOAM ADHESIVE - HYDROGEL RADIOTRNSPRNT (50/PK)

## (undated) DEVICE — SET EXTENSION WITH 2 PORTS (48EA/CA) ***PART #2C8610 IS A SUBSTITUTE*****

## (undated) DEVICE — SUTURE 3-0 MONOCRYL PLUS PS-2 - (12/BX)

## (undated) DEVICE — BLADE SAW OSCILLATING 8.0 X 1.27 X 70MM

## (undated) DEVICE — SYRINGE ASEPTO - (50EA/CA

## (undated) DEVICE — SUTURE 0 VICRYL PLUS CT-2 - 8 X 18 INCH (12/BX)

## (undated) DEVICE — SUTURE 3-0 ETHILON FS-1 - (36/BX) 30 INCH

## (undated) DEVICE — CONTAINER SPECIMEN BAG OR - STERILE 4 OZ W/LID (100EA/CA)

## (undated) DEVICE — GLOVE BIOGEL INDICATOR SZ 6.5 SURGICAL PF LTX - (50PR/BX 4BX/CA)

## (undated) DEVICE — TRAY CATHETER FOLEY URINE METER W/STATLOCK 350ML (10EA/CA)

## (undated) DEVICE — SENSOR SPO2 NEO LNCS ADHESIVE (20/BX) SEE USER NOTES

## (undated) DEVICE — SPONGE GAUZE NON-STERILE 4X4 - (2000/CA 10PK/CA)

## (undated) DEVICE — COVER LIGHT HANDLE ALC PLUS DISP (18EA/BX)

## (undated) DEVICE — MASK ANESTHESIA ADULT  - (100/CA)

## (undated) DEVICE — GLOVE BIOGEL INDICATOR SZ 8 SURGICAL PF LTX - (50/BX 4BX/CA)

## (undated) DEVICE — ELECTRODE DUAL RETURN W/ CORD - (50/PK)

## (undated) DEVICE — GLOVE SZ 7 BIOGEL PI MICRO - PF LF (50PR/BX 4BX/CA)

## (undated) DEVICE — KIT EVACUATER 3 SPRING PVC LF 1/8 DRAIN SIZE (10EA/CA)"

## (undated) DEVICE — WRAP CO-FLEX 4IN X 5YD STERIL - SELF-ADHERENT (18/CA)

## (undated) DEVICE — BLADE SURGICAL CLIPPER - (50EA/CA)

## (undated) DEVICE — DRESSING ABDOMINAL PAD STERILE 8 X 10" (360EA/CA)"

## (undated) DEVICE — CANNULA O2 COMFORT SOFT EAR ADULT 7 FT TUBING (50/CA)

## (undated) DEVICE — CANISTER SUCTION 3000ML MECHANICAL FILTER AUTO SHUTOFF MEDI-VAC NONSTERILE LF DISP  (40EA/CA)

## (undated) DEVICE — GLOVE BIOGEL SZ 6.5 SURGICAL PF LTX (50PR/BX 4BX/CA)

## (undated) DEVICE — DRAPE 36X28IN RAD CARM BND BG - (25/CA) O

## (undated) DEVICE — GLOVE BIOGEL ECLIPSE PF LATEX SIZE 8.0  (50PR/BX)

## (undated) DEVICE — GLOVE BIOGEL INDICATOR SZ 8.5 SURGICAL PF LTX - (50/BX 4BX/CA)

## (undated) DEVICE — GOWN SURGEONS X-LARGE - DISP. (30/CA)

## (undated) DEVICE — STOCKINET BIAS 6 IN STERILE - (20/CA)

## (undated) DEVICE — SPLINT PLASTER 5 IN X 30 IN - (50EA/BX 6BX/CA)

## (undated) DEVICE — DISPOSABLE WOUND VAC PICO 10 X 20 CM - WOUND CARE (3/CA)

## (undated) DEVICE — CANISTER SUCTION RIGID RED 1500CC (40EA/CA)

## (undated) DEVICE — KIT ANESTHESIA W/CIRCUIT & 3/LT BAG W/FILTER (20EA/CA)

## (undated) DEVICE — BOVIE BLADE COATED - (50/PK)

## (undated) DEVICE — BANDAGE ELASTIC 6 HONEYCOMB - 6X5YD LF (20/CA)"

## (undated) DEVICE — SYRINGE SAFETY 5 ML 18 GA X 1-1/2 BLUNT LL (100/BX 4BX/CA)